# Patient Record
Sex: FEMALE | Race: WHITE | NOT HISPANIC OR LATINO
[De-identification: names, ages, dates, MRNs, and addresses within clinical notes are randomized per-mention and may not be internally consistent; named-entity substitution may affect disease eponyms.]

---

## 2017-01-04 ENCOUNTER — APPOINTMENT (OUTPATIENT)
Dept: SURGICAL ONCOLOGY | Facility: CLINIC | Age: 67
End: 2017-01-04

## 2017-01-04 ENCOUNTER — OTHER (OUTPATIENT)
Age: 67
End: 2017-01-04

## 2017-01-04 VITALS
SYSTOLIC BLOOD PRESSURE: 130 MMHG | BODY MASS INDEX: 39.24 KG/M2 | HEART RATE: 79 BPM | DIASTOLIC BLOOD PRESSURE: 82 MMHG | WEIGHT: 250 LBS | HEIGHT: 67 IN

## 2017-04-05 ENCOUNTER — APPOINTMENT (OUTPATIENT)
Dept: SURGICAL ONCOLOGY | Facility: CLINIC | Age: 67
End: 2017-04-05

## 2017-04-05 VITALS
DIASTOLIC BLOOD PRESSURE: 82 MMHG | BODY MASS INDEX: 39.24 KG/M2 | WEIGHT: 250 LBS | HEIGHT: 67 IN | SYSTOLIC BLOOD PRESSURE: 130 MMHG

## 2017-07-11 ENCOUNTER — FORM ENCOUNTER (OUTPATIENT)
Age: 67
End: 2017-07-11

## 2017-07-12 ENCOUNTER — APPOINTMENT (OUTPATIENT)
Dept: SURGICAL ONCOLOGY | Facility: CLINIC | Age: 67
End: 2017-07-12

## 2017-07-12 ENCOUNTER — APPOINTMENT (OUTPATIENT)
Dept: RADIOLOGY | Facility: CLINIC | Age: 67
End: 2017-07-12

## 2017-07-12 ENCOUNTER — OUTPATIENT (OUTPATIENT)
Dept: OUTPATIENT SERVICES | Facility: HOSPITAL | Age: 67
LOS: 1 days | End: 2017-07-12
Payer: COMMERCIAL

## 2017-07-12 VITALS
TEMPERATURE: 98.1 F | HEART RATE: 84 BPM | SYSTOLIC BLOOD PRESSURE: 138 MMHG | HEIGHT: 68 IN | BODY MASS INDEX: 37.89 KG/M2 | OXYGEN SATURATION: 95 % | DIASTOLIC BLOOD PRESSURE: 82 MMHG | WEIGHT: 250 LBS

## 2017-07-12 DIAGNOSIS — Z00.8 ENCOUNTER FOR OTHER GENERAL EXAMINATION: ICD-10-CM

## 2017-07-12 PROCEDURE — 71046 X-RAY EXAM CHEST 2 VIEWS: CPT

## 2017-10-29 ENCOUNTER — TRANSCRIPTION ENCOUNTER (OUTPATIENT)
Age: 67
End: 2017-10-29

## 2017-11-01 ENCOUNTER — APPOINTMENT (OUTPATIENT)
Dept: SURGICAL ONCOLOGY | Facility: CLINIC | Age: 67
End: 2017-11-01
Payer: COMMERCIAL

## 2017-11-01 VITALS
DIASTOLIC BLOOD PRESSURE: 79 MMHG | HEIGHT: 68 IN | WEIGHT: 250 LBS | SYSTOLIC BLOOD PRESSURE: 154 MMHG | BODY MASS INDEX: 37.89 KG/M2 | HEART RATE: 87 BPM | OXYGEN SATURATION: 97 %

## 2017-11-01 PROCEDURE — 99214 OFFICE O/P EST MOD 30 MIN: CPT

## 2018-02-07 ENCOUNTER — APPOINTMENT (OUTPATIENT)
Dept: SURGICAL ONCOLOGY | Facility: CLINIC | Age: 68
End: 2018-02-07

## 2018-02-08 ENCOUNTER — APPOINTMENT (OUTPATIENT)
Dept: SURGICAL ONCOLOGY | Facility: CLINIC | Age: 68
End: 2018-02-08
Payer: MEDICARE

## 2018-02-08 VITALS
HEIGHT: 67 IN | OXYGEN SATURATION: 99 % | BODY MASS INDEX: 40.18 KG/M2 | DIASTOLIC BLOOD PRESSURE: 84 MMHG | SYSTOLIC BLOOD PRESSURE: 129 MMHG | TEMPERATURE: 97.8 F | HEART RATE: 86 BPM | WEIGHT: 256 LBS

## 2018-02-08 PROCEDURE — 99214 OFFICE O/P EST MOD 30 MIN: CPT

## 2018-05-24 ENCOUNTER — APPOINTMENT (OUTPATIENT)
Dept: SURGICAL ONCOLOGY | Facility: CLINIC | Age: 68
End: 2018-05-24

## 2018-06-13 ENCOUNTER — FORM ENCOUNTER (OUTPATIENT)
Age: 68
End: 2018-06-13

## 2018-06-14 ENCOUNTER — APPOINTMENT (OUTPATIENT)
Dept: RADIOLOGY | Facility: CLINIC | Age: 68
End: 2018-06-14
Payer: MEDICARE

## 2018-06-14 ENCOUNTER — APPOINTMENT (OUTPATIENT)
Dept: SURGICAL ONCOLOGY | Facility: CLINIC | Age: 68
End: 2018-06-14
Payer: MEDICARE

## 2018-06-14 ENCOUNTER — OUTPATIENT (OUTPATIENT)
Dept: OUTPATIENT SERVICES | Facility: HOSPITAL | Age: 68
LOS: 1 days | End: 2018-06-14
Payer: MEDICARE

## 2018-06-14 VITALS
HEART RATE: 79 BPM | BODY MASS INDEX: 40.18 KG/M2 | HEIGHT: 67 IN | WEIGHT: 256 LBS | TEMPERATURE: 98.3 F | DIASTOLIC BLOOD PRESSURE: 79 MMHG | SYSTOLIC BLOOD PRESSURE: 123 MMHG

## 2018-06-14 DIAGNOSIS — Z00.8 ENCOUNTER FOR OTHER GENERAL EXAMINATION: ICD-10-CM

## 2018-06-14 DIAGNOSIS — R91.1 SOLITARY PULMONARY NODULE: ICD-10-CM

## 2018-06-14 PROCEDURE — 71046 X-RAY EXAM CHEST 2 VIEWS: CPT | Mod: 26

## 2018-06-14 PROCEDURE — 71046 X-RAY EXAM CHEST 2 VIEWS: CPT

## 2018-06-14 PROCEDURE — 99213 OFFICE O/P EST LOW 20 MIN: CPT

## 2018-06-15 PROBLEM — R91.1 NODULE OF RIGHT LUNG: Status: ACTIVE | Noted: 2018-06-15

## 2018-06-21 ENCOUNTER — FORM ENCOUNTER (OUTPATIENT)
Age: 68
End: 2018-06-21

## 2018-06-22 ENCOUNTER — OUTPATIENT (OUTPATIENT)
Dept: OUTPATIENT SERVICES | Facility: HOSPITAL | Age: 68
LOS: 1 days | End: 2018-06-22
Payer: MEDICARE

## 2018-06-22 ENCOUNTER — APPOINTMENT (OUTPATIENT)
Dept: CT IMAGING | Facility: CLINIC | Age: 68
End: 2018-06-22
Payer: MEDICARE

## 2018-06-22 DIAGNOSIS — Z00.8 ENCOUNTER FOR OTHER GENERAL EXAMINATION: ICD-10-CM

## 2018-06-22 PROCEDURE — 71250 CT THORAX DX C-: CPT | Mod: 26

## 2018-06-22 PROCEDURE — 71250 CT THORAX DX C-: CPT

## 2018-09-13 ENCOUNTER — APPOINTMENT (OUTPATIENT)
Dept: SURGICAL ONCOLOGY | Facility: CLINIC | Age: 68
End: 2018-09-13
Payer: MEDICARE

## 2018-09-13 VITALS
HEART RATE: 79 BPM | BODY MASS INDEX: 39.87 KG/M2 | SYSTOLIC BLOOD PRESSURE: 124 MMHG | DIASTOLIC BLOOD PRESSURE: 78 MMHG | HEIGHT: 67 IN | WEIGHT: 254 LBS | TEMPERATURE: 98.6 F

## 2018-09-13 PROCEDURE — 99214 OFFICE O/P EST MOD 30 MIN: CPT

## 2019-03-28 ENCOUNTER — APPOINTMENT (OUTPATIENT)
Dept: SURGICAL ONCOLOGY | Facility: CLINIC | Age: 69
End: 2019-03-28
Payer: MEDICARE

## 2019-03-28 VITALS
TEMPERATURE: 98.5 F | HEIGHT: 67 IN | HEART RATE: 81 BPM | SYSTOLIC BLOOD PRESSURE: 135 MMHG | DIASTOLIC BLOOD PRESSURE: 79 MMHG | WEIGHT: 261 LBS | BODY MASS INDEX: 40.97 KG/M2

## 2019-03-28 DIAGNOSIS — Z85.820 PERSONAL HISTORY OF MALIGNANT MELANOMA OF SKIN: ICD-10-CM

## 2019-03-28 PROCEDURE — 99214 OFFICE O/P EST MOD 30 MIN: CPT

## 2019-03-28 NOTE — ASSESSMENT
[FreeTextEntry1] : IMP:\par Hx WLE of the back melanoma and bilateral axillary sentinel lymph node biopsies under the care of Dr. Ignacio Juarez in September 2016.  Final pathology revealed no residual melanoma, maximum tumor thickness measuring 2.2 mm, negative margins, negative lymph nodes. \par Tumor stage: pT3b pN0. \par CXR June 2018 - New faint 1.1 cm nodular density in the right apex, of indeterminate nature.  \par CT Chest 6/22/18 - The abnormality of the recent CXR was secondary to summation of shadows. Bilateral pulmonary nodules, the largest of which measures 5 mm. Left upper lobe ground-glass opacity measuring 5 mm. \par \par No evidence of recurrence\par \par \par PLAN:\par 1) Continue ongoing dermatological surveillance \par 2) CXR June 2019\par 3) RTO in 6 months

## 2019-03-28 NOTE — CONSULT LETTER
[Dear  ___] : Dear  [unfilled], [Courtesy Letter:] : I had the pleasure of seeing your patient, [unfilled], in my office today. [Please see my note below.] : Please see my note below. [Consult Closing:] : Thank you very much for allowing me to participate in the care of this patient.  If you have any questions, please do not hesitate to contact me. [Sincerely,] : Sincerely, [DrSabine  ___] : Dr. CROOKS [FreeTextEntry3] : Yury Mirza MD, MPH, FACS\par Surgical Oncology\par Assistant Professor of Surgery\par [facility] [employer] at [facility]

## 2019-03-28 NOTE — PHYSICAL EXAM
[Normal] : supple, no neck mass and thyroid not enlarged [Normal Neck Lymph Nodes] : normal neck lymph nodes  [Normal Supraclavicular Lymph Nodes] : normal supraclavicular lymph nodes [Normal Axillary Lymph Nodes] : normal axillary lymph nodes [Normal] : oriented to person, place and time, with appropriate affect [de-identified] : no evidence of local or regional occurrence disease back

## 2019-03-28 NOTE — HISTORY OF PRESENT ILLNESS
[de-identified] : Mrs. Reed is a 68 year old female who presents to the office for a 6 month melanoma follow up visit. She is a former patient of Dr. Ignacio Juarez. \par \par She underwent a shave biopsy of an upper back lesion in 7/2016 and was found to have a MELANOMA, ulcerated, nodular type, measuring at least 2.2 mm (outside report said 2.8 mm), mitotic rate >1/mm2.  Inferior back shave biopsy showed a MELANOMA IN SITU, associated with intradermal melanocytic nevus. \par \par Subsequently, she underwent a WLE of the back melanoma and bilateral axillary sentinel lymph node biopsies under the care of Dr. Ignacio Juarez in September 2016.  Final pathology revealed no residual melanoma, maximum tumor thickness measuring 2.2 mm, negative margins, negative lymph nodes. Tumor stage: pT3b pN0. \par \par CXR 7/2017 - clear lungs\par \par INTERVAL HISTORY:\par 6/14/18 - Patient without any new complaints.  No new biopsies. \par \par CXR June 2018 - New faint 1.1 cm nodular density in the right apex, of indeterminate nature. CT Chest recommended. \par \par CT Chest 6/22/18 - The abnormality of the recent CXR was secondary to summation of shadows. Bilateral pulmonary nodules, the largest of which measures 5 mm. Left upper lobe groundglass opacity measuring 5 mm. \par \par 9/13/18 - Continues ongoing dermatological surveillance with no new findings.  Doing very well.  Had all her children / grandchildren over for the summer.\par \par 3/28/19 - Denies new skin lesions or masses. No bleeding or pruritic moles. She continues f/u with ALBERTO Camacho at [facility] and was last seen in 12/2018 with no new biopsies. She states she sees dermatology every 3-4 months.  Feeling well.

## 2019-06-25 ENCOUNTER — APPOINTMENT (OUTPATIENT)
Dept: RADIOLOGY | Facility: CLINIC | Age: 69
End: 2019-06-25

## 2019-06-25 ENCOUNTER — OUTPATIENT (OUTPATIENT)
Dept: OUTPATIENT SERVICES | Facility: HOSPITAL | Age: 69
LOS: 1 days | End: 2019-06-25
Payer: MEDICARE

## 2019-06-25 DIAGNOSIS — Z00.8 ENCOUNTER FOR OTHER GENERAL EXAMINATION: ICD-10-CM

## 2019-06-25 PROCEDURE — 71046 X-RAY EXAM CHEST 2 VIEWS: CPT | Mod: 26

## 2019-06-25 PROCEDURE — 71046 X-RAY EXAM CHEST 2 VIEWS: CPT

## 2019-09-26 ENCOUNTER — APPOINTMENT (OUTPATIENT)
Age: 69
End: 2019-09-26
Payer: MEDICARE

## 2019-09-26 VITALS — DIASTOLIC BLOOD PRESSURE: 82 MMHG | HEART RATE: 71 BPM | SYSTOLIC BLOOD PRESSURE: 143 MMHG

## 2019-09-26 PROCEDURE — 99214 OFFICE O/P EST MOD 30 MIN: CPT

## 2019-09-26 NOTE — HISTORY OF PRESENT ILLNESS
[de-identified] : Mrs. Reed is a 69 year old female who presents to the office for a 6 month melanoma follow up visit. She is a former patient of Dr. Ignacio Juarez. \par \par She underwent a shave biopsy of an upper back lesion in 7/2016 and was found to have a MELANOMA, ulcerated, nodular type, measuring at least 2.2 mm (outside report said 2.8 mm), mitotic rate >1/mm2.  Inferior back shave biopsy showed a MELANOMA IN SITU, associated with intradermal melanocytic nevus. \par \par Subsequently, she underwent a WLE of the back melanoma and bilateral axillary sentinel lymph node biopsies under the care of Dr. Ignacio Juarez in September 2016.  Final pathology revealed no residual melanoma, maximum tumor thickness measuring 2.2 mm, negative margins, negative lymph nodes. Tumor stage: pT3b pN0. \par \par CXR 7/2017 - clear lungs\par \par INTERVAL HISTORY:\par 6/14/18 - Patient without any new complaints.  No new biopsies. \par \par CXR June 2018 - New faint 1.1 cm nodular density in the right apex, of indeterminate nature. CT Chest recommended. \par \par CT Chest 6/22/18 - The abnormality of the recent CXR was secondary to summation of shadows. Bilateral pulmonary nodules, the largest of which measures 5 mm. Left upper lobe groundglass opacity measuring 5 mm. \par \par 9/13/18 - Continues ongoing dermatological surveillance with no new findings.  Doing very well.  Had all her children / grandchildren over for the summer.\par \par 3/28/19 - Denies new skin lesions or masses. No bleeding or pruritic moles. She continues f/u with ALBERTO Camacho at [facility] and was last seen in 12/2018 with no new biopsies. She states she sees dermatology every 3-4 months.  Feeling well. \par \par CXR 6/2019- DAMON, clear lungs \par \par 9/26/19- No new events.  No new biopsies.

## 2019-09-26 NOTE — PHYSICAL EXAM
[Normal] : supple, no neck mass and thyroid not enlarged [Normal Neck Lymph Nodes] : normal neck lymph nodes  [Normal Supraclavicular Lymph Nodes] : normal supraclavicular lymph nodes [Normal Axillary Lymph Nodes] : normal axillary lymph nodes [de-identified] : no evidence of local or regional occurrence disease back [Normal] : oriented to person, place and time, with appropriate affect

## 2019-09-26 NOTE — CONSULT LETTER
[Dear  ___] : Dear  [unfilled], [Please see my note below.] : Please see my note below. [Courtesy Letter:] : I had the pleasure of seeing your patient, [unfilled], in my office today. [Consult Closing:] : Thank you very much for allowing me to participate in the care of this patient.  If you have any questions, please do not hesitate to contact me. [Sincerely,] : Sincerely, [DrSabine  ___] : Dr. CROOKS [FreeTextEntry3] : Yury Mirza MD, MPH, FACS\par Surgical Oncology\par Assistant Professor of Surgery\par [facility] [employer] [facility] at [facility]

## 2019-09-26 NOTE — ASSESSMENT
[FreeTextEntry1] : IMP:\par Hx WLE of the back melanoma and bilateral axillary sentinel lymph node biopsies under the care of Dr. Ignacio Juarez in September 2016.  Final pathology revealed no residual melanoma, maximum tumor thickness measuring 2.2 mm, negative margins, negative lymph nodes. \par Tumor stage: pT3b pN0. \par CXR June 2018 - New faint 1.1 cm nodular density in the right apex, of indeterminate nature.  \par CT Chest 6/22/18 - The abnormality of the recent CXR was secondary to summation of shadows. Bilateral pulmonary nodules, the largest of which measures 5 mm. Left upper lobe ground-glass opacity measuring 5 mm. \par CXR 6/2019- DAMON, clear lungs \par No evidence of recurrence\par \par PLAN:\par 1) Continue ongoing dermatological surveillance \par 2) RTO in 6 months\par 3) CXR 6/2020

## 2020-03-12 ENCOUNTER — APPOINTMENT (OUTPATIENT)
Dept: SURGICAL ONCOLOGY | Facility: CLINIC | Age: 70
End: 2020-03-12
Payer: MEDICARE

## 2020-03-12 VITALS — HEART RATE: 73 BPM | TEMPERATURE: 98.2 F | DIASTOLIC BLOOD PRESSURE: 75 MMHG | SYSTOLIC BLOOD PRESSURE: 119 MMHG

## 2020-03-12 DIAGNOSIS — Z85.820 ENCOUNTER FOR FOLLOW-UP EXAMINATION AFTER COMPLETED TREATMENT FOR MALIGNANT NEOPLASM: ICD-10-CM

## 2020-03-12 DIAGNOSIS — Z08 ENCOUNTER FOR FOLLOW-UP EXAMINATION AFTER COMPLETED TREATMENT FOR MALIGNANT NEOPLASM: ICD-10-CM

## 2020-03-12 PROCEDURE — 99214 OFFICE O/P EST MOD 30 MIN: CPT

## 2020-03-13 PROBLEM — Z08 ENCOUNTER FOR FOLLOW-UP SURVEILLANCE OF MELANOMA: Status: ACTIVE | Noted: 2018-02-08

## 2020-03-13 NOTE — CONSULT LETTER
[Dear  ___] : Dear  [unfilled], [Courtesy Letter:] : I had the pleasure of seeing your patient, [unfilled], in my office today. [Please see my note below.] : Please see my note below. [Consult Closing:] : Thank you very much for allowing me to participate in the care of this patient.  If you have any questions, please do not hesitate to contact me. [Sincerely,] : Sincerely, [FreeTextEntry3] : Yury Mirza MD, MPH, FACS\par Surgical Oncology\par Assistant Professor of Surgery\par [facility] [employer] at [facility] [DrSabine  ___] : Dr. CROOKS

## 2020-03-13 NOTE — HISTORY OF PRESENT ILLNESS
[de-identified] : Mrs. Reed is a 69 year old female who presents to the office for a 6 month melanoma follow up visit. She is a former patient of Dr. Ignacio Juarez. \par \par She underwent a shave biopsy of an upper back lesion in 7/2016 and was found to have a MELANOMA, ulcerated, nodular type, measuring at least 2.2 mm (outside report said 2.8 mm), mitotic rate >1/mm2.  Inferior back shave biopsy showed a MELANOMA IN SITU, associated with intradermal melanocytic nevus. \par \par Subsequently, she underwent a WLE of the back melanoma and bilateral axillary sentinel lymph node biopsies under the care of Dr. Ignacio Juarez in September 2016.  Final pathology revealed no residual melanoma, maximum tumor thickness measuring 2.2 mm, negative margins, negative lymph nodes. Tumor stage: pT3b pN0. \par \par CXR 7/2017 - clear lungs\par \par INTERVAL HISTORY:\par 6/14/18 - Patient without any new complaints.  No new biopsies. \par \par CXR June 2018 - New faint 1.1 cm nodular density in the right apex, of indeterminate nature. CT Chest recommended. \par \par CT Chest 6/22/18 - The abnormality of the recent CXR was secondary to summation of shadows. Bilateral pulmonary nodules, the largest of which measures 5 mm. Left upper lobe groundglass opacity measuring 5 mm. \par \par 9/13/18 - Continues ongoing dermatological surveillance with no new findings.  Doing very well.  Had all her children / grandchildren over for the summer.\par \par 3/28/19 - Denies new skin lesions or masses. No bleeding or pruritic moles. She continues f/u with ALBERTO Camacho at [facility] and was last seen in 12/2018 with no new biopsies. She states she sees dermatology every 3-4 months.  Feeling well. \par \par CXR 6/2019- DAMON, clear lungs \par \par 9/26/19- No new events.  No new biopsies.\par \par 3/12/2020- No new events.

## 2020-03-13 NOTE — PHYSICAL EXAM
[Normal] : supple, no neck mass and thyroid not enlarged [Normal Neck Lymph Nodes] : normal neck lymph nodes  [Normal Supraclavicular Lymph Nodes] : normal supraclavicular lymph nodes [Normal Axillary Lymph Nodes] : normal axillary lymph nodes [Normal] : oriented to person, place and time, with appropriate affect [de-identified] : no evidence of local or regional occurrence disease back

## 2021-08-04 ENCOUNTER — TRANSCRIPTION ENCOUNTER (OUTPATIENT)
Age: 71
End: 2021-08-04

## 2022-09-20 ENCOUNTER — APPOINTMENT (OUTPATIENT)
Dept: ORTHOPEDIC SURGERY | Facility: CLINIC | Age: 72
End: 2022-09-20
Payer: MEDICARE

## 2022-09-20 ENCOUNTER — APPOINTMENT (OUTPATIENT)
Dept: ORTHOPEDIC SURGERY | Facility: CLINIC | Age: 72
End: 2022-09-20

## 2022-09-20 VITALS
HEIGHT: 66 IN | BODY MASS INDEX: 40.18 KG/M2 | SYSTOLIC BLOOD PRESSURE: 141 MMHG | DIASTOLIC BLOOD PRESSURE: 87 MMHG | TEMPERATURE: 97.9 F | HEART RATE: 81 BPM | WEIGHT: 250 LBS

## 2022-09-20 DIAGNOSIS — M87.051 IDIOPATHIC ASEPTIC NECROSIS OF RIGHT FEMUR: ICD-10-CM

## 2022-09-20 DIAGNOSIS — M87.052 IDIOPATHIC ASEPTIC NECROSIS OF LEFT FEMUR: ICD-10-CM

## 2022-09-20 DIAGNOSIS — M25.552 PAIN IN RIGHT HIP: ICD-10-CM

## 2022-09-20 DIAGNOSIS — M25.551 PAIN IN RIGHT HIP: ICD-10-CM

## 2022-09-20 PROCEDURE — 99204 OFFICE O/P NEW MOD 45 MIN: CPT

## 2022-09-20 PROCEDURE — ZZZZZ: CPT

## 2022-09-20 PROCEDURE — 73523 X-RAY EXAM HIPS BI 5/> VIEWS: CPT

## 2022-09-26 NOTE — HISTORY OF PRESENT ILLNESS
[de-identified] : 09/20/2022: Patient is a 72 year-old female who presents to the office today, with her , for initial evaluation of bilateral hip pain. She presents today in a wheelchair that she uses when she goes out of the house. She normally uses a walker and a cane for ambulation around the house. The pain has been like this for the past 2 months and is progressively worsening. Patient denies any injury or inciting event. She does note that she was walking 2-3 miles per day with her neighbor just a few months ago. She notes that she has progressed quickly to being inactive due to her hip pain. She does note that her pain is worse on the right than the left. Currently, she is on oxycodone for the pain that is prescribed by her oncologist. Motrin significantly worsens bleeding of her hemorrhoids. \par \par She sees an oncologist at [facility] [place]. She has a history of metastatic melanoma for which she has been treated with infusions of medications. She believes that she is in remission.  \par

## 2022-09-26 NOTE — PHYSICAL EXAM
[Normal Finger/nose] : finger to nose coordination [Normal] : no peripheral adenopathy appreciated [Poor Appearance] : well-appearing [Acute Distress] : not in acute distress [de-identified] : Bilateral Hip Examination\par  \par Patient unable to physically get out of her wheelchair and refused full physical examination, including ROM, due to significant pain\par Only examination the patient was able to address was neurovascular status\par Calves soft/NT, +DF/PF bilaterally\par Sensation intact to touch throughout leg and equal bilaterally, distal pulses 2+.\par  [de-identified] : SEKOUR [de-identified] : 3 xray views of the PELVIS and bilateral hips\par \par -Significant collapse of bilateral femoral heads consistent with severe osteoarthritis or AVN of the hips\par -Left superior/inferior pubic rami fracture

## 2022-09-26 NOTE — ASSESSMENT
[FreeTextEntry1] : Patient presents with bilateral hip pain. Their symptoms are consistent with likely bilateral hip AVN as well as a left superior/inferior pubic rami fracture. The nature of these conditions were described at length with the patient and they verbalized understanding. \par \par Recommendations: \par -A long discussion was had with the patient about treatment options. She was advised to seek surgical consultation as soon as she can with Dr. Salazar. All options including, conservative observation, PT, injections, surgery, etc. were discussed at length with the patient at long length. \par -It was discussed that likely surgery would be the most effective course of treatment to improve pain/quality of life for her. Surgical intervention is complicated for her due to numerous factors.\par -She asked if she could be admitted to [facility] for emergency surgery, however, I discussed with her that this would not be considered an emergent condition and would have to be set up electively with many different situations that would need to be addressed in order to optimize for surgery (if indicated by surgeon).\par -Cortisone injections to the hip will likely be ineffective at this time\par -Conservative observation is likely unreasonable at this time\par -Patient should participate in PT for lower extremity strengthening/conditioning, as she has been in a wheelchair most of the time over the past 1-2 months. Significant deconditioning is expected if no PT is performed. I recommend home PT, as the patient cannot drive/travel in her current state. This will optimize the patient from a rehabilitation standpoint for possible surgical intervention, should it be indicated.\par -Oral anti-inflammatories are contraindicated in the patient. She expresses a history of significant rectal/hemorrhoidal bleeding when taking NSAIDs. I explained that these medications would likely improve her symptoms slightly, but the risk of bleeding could delay/impact any possible surgical intervention due to decreased hemoglobin/hematocrit, and therefore NSAIDs should not be used. She is encouraged to take Tylenol and continue with the oxycodone that was prescribed by her oncologist.\par -She has a complicated medical history with her melanoma and I explained that there will likely be medical optimization needed prior to any surgical procedure. She knows that this would be discussed with her surgeon, should surgery be indicated at her visit.\par -Patient was encouraged to be out of the wheelchair for assisted ambulation as much as possible. She endorses the use of cane/walker at home for ambulation assistance.\par -Followup with Dr. Salazar as soon as possible for possible surgical intervention/discussion.\par \par 54 minutes of time was utilized to obtain a patient history, obtain and interpret proper radiographic imaging, perform a detailed physical examination, discuss the patient's diagnosis at length, inform the patient about the different treatment plans for their condition, and answer questions the patient had regarding their visit.\par \par \par The patient was in full agreement with this plan and appreciative of their care.\par

## 2022-09-28 ENCOUNTER — APPOINTMENT (OUTPATIENT)
Dept: ORTHOPEDIC SURGERY | Facility: CLINIC | Age: 72
End: 2022-09-28

## 2022-09-28 VITALS
WEIGHT: 250 LBS | DIASTOLIC BLOOD PRESSURE: 82 MMHG | BODY MASS INDEX: 40.18 KG/M2 | SYSTOLIC BLOOD PRESSURE: 128 MMHG | HEART RATE: 82 BPM | HEIGHT: 66 IN

## 2022-09-28 DIAGNOSIS — S32.592D OTHER SPECIFIED FRACTURE OF LEFT PUBIS, SUBSEQUENT ENCOUNTER FOR FRACTURE WITH ROUTINE HEALING: ICD-10-CM

## 2022-09-28 PROCEDURE — 99215 OFFICE O/P EST HI 40 MIN: CPT

## 2022-09-28 NOTE — PHYSICAL EXAM
[LE] : Sensory: Intact in bilateral lower extremities [ALL] : dorsalis pedis, posterior tibial, femoral, popliteal, and radial 2+ and symmetric bilaterally [Normal] : Alert and in no acute distress [Wheelchair] : uses a wheelchair [Poor Appearance] : well-appearing [de-identified] : GENERAL APPEARANCE: Well nourished and hydrated, pleasant, alert, and oriented x 3. Appears their stated age. \par HEENT: Normocephalic, extraocular eye motion intact. Nasal septum midline. Oral cavity clear. External auditory canal clear. \par RESPIRATORY: Breath sounds clear and audible in all lobes. No wheezing, No accessory muscle use.\par CARDIOVASCULAR: No apparent abnormalities. No lower leg edema. No varicosities. Pedal pulses are palpable.\par NEUROLOGIC: Sensation is normal, no muscle weakness in the upper or lower extremities.\par DERMATOLOGIC: No apparent skin lesions, moist, warm, no rash.\par SPINE: Cervical spine appears normal and moves freely; thoracic spine appears normal and moves freely; lumbosacral spine appears normal and moves freely, normal, nontender.\par MUSCULOSKELETAL: Hands, wrists, and elbows are normal and move freely, shoulders are normal and move freely.  [de-identified] : Bilateral hip exam shows positive Stinchfield, pain with ROM, greater trochanteric pain.  Unable to ambulate secondary to pain [de-identified] : 5V xray of the b/l hip done in the office by Ehsan Katz nd reviewed by Dr. Randy Salazar demonstrates avascular necrosis and with collapse of the articular surface of both hips. She has marked osteopenia.

## 2022-09-28 NOTE — DISCUSSION/SUMMARY
[Medication Risks Reviewed] : Medication risks reviewed [Surgical risks reviewed] : Surgical risks reviewed [de-identified] : 73 y/o F pt presents with  avascular necrosis and with collapse of the articular surface of both hips. Based on her imaging, she is a candidate for a staged bilateral RAVI t. The surgery was discussed in detail. The pt states that she has been failing conservative therapy options. We recommend having a DEXA scan for her bones since she does have osteoporosis. Since her pain has been effecting her quality of life we suggest having the surgery as soon as possible. She will talk with the surgical coordinators to have both hips done. All questions were answered.  We are going to stage these hips apart by only a few days within the same hospitalization because she is unable to ambulate and I think she will not progress with only doing one side.  She is at high risk of very operative complication specifically fracture and infection.  We will schedule her right hip for Thursday followed by a left hip replacement on a Tuesday and she will remain in the hospital during a period\par \par The patient is a 72 year individual with end stage arthritis of their b/l hips joint. Based upon the patient's continued symptoms and failure to respond to conservative treatment I have recommended a b/l total hips arthroplasty for this patient. A long discussion took place with the patient describing what a total joint replacement is and what the procedure would entail. A total hip arthroplasty model, similar to the implant that was used during the operation, was utilized to demonstrate and to discuss the various bearing surfaces of the implants. The hospitalization and post-operative care and rehabilitation were also discussed. The use of perioperative antibiotics and DVT prophylaxis were discussed. The risk, benefits and alternatives to a surgical intervention were discussed at length with the patient. The patient was also advised of risks related to the medical comorbidities, elevated body mass index (BMI), and smoking where applicable. We discussed how to reduce modifiable risk factors and encouraged smoking cessation were applicable.. A lengthy discussion took place to review the most common complications including but not limited to: deep vein thrombosis, pulmonary embolus, heart attack, stroke, infection, wound breakdown, numbness, damage to nerves, tendon, muscles, arteries or other blood vessels, death and other possible complications from anesthesia. The patient was told that we will take steps to minimize these risks by using sterile technique, antibiotics and DVT prophylaxis when appropriate and follow the patient postoperatively in the office setting to monitor progress. The possibility of recurrent pain, no improvement in pain and actual worsening of pain were also discussed with the patient.\par The discharge plan of care focused on the patient going home following surgery. The patient was encouraged to make the necessary arrangements to have someone stay with them when they are discharged home. Following discharge, a home care nurse was to the patient. The home care nurse would open the patient’s home care case and request home physical therapy services. Home physical therapy was to commence following discharge provided it was appropriate and covered by the health insurance benefit plan. \par The benefits of surgery were discussed with the patient including the potential for improving her current clinical condition through operative intervention. Alternatives to surgical intervention including continued conservative management were also discussed in detail. All questions were answered to the satisfaction of the patient. The treatment plan of care, as well as a model of a total hip arthroplasty equivalent to the one that will be used for their total joint replacement, was shared with the patient. The patient agreed to the plan of care as well as the use of implants in their total joint replacement.

## 2022-09-28 NOTE — HISTORY OF PRESENT ILLNESS
[6] : a current pain level of 6/10 [2] : a minimum pain level of 2/10 [9] : a maximum pain level of 9/10 [Bending] : worsened by bending [Walking] : worsened by walking [Rest] : relieved by rest [Pain Location] : pain [Worsening] : worsening [de-identified] : Patient is a 72 year old female who presents for bilateral hip pain. patient was treated with immunotherapy for Melanoma which she is currently in remission however developed bilateral hip pain over past few months.  patient on oxycodone for pain.  patient unable to take NSAIDS.  patient unable to ambulate due to pain.  patient states unable to perform any ADL's\par \par pmhx includes melanoma and htn\par patient being treated by [facility]\par She has a hx of osteoporosis.

## 2022-10-04 ENCOUNTER — OUTPATIENT (OUTPATIENT)
Dept: OUTPATIENT SERVICES | Facility: HOSPITAL | Age: 72
LOS: 1 days | End: 2022-10-04
Payer: MEDICARE

## 2022-10-04 VITALS
OXYGEN SATURATION: 98 % | HEART RATE: 84 BPM | SYSTOLIC BLOOD PRESSURE: 140 MMHG | HEIGHT: 66 IN | RESPIRATION RATE: 16 BRPM | WEIGHT: 250.45 LBS | TEMPERATURE: 98 F | DIASTOLIC BLOOD PRESSURE: 86 MMHG

## 2022-10-04 DIAGNOSIS — I10 ESSENTIAL (PRIMARY) HYPERTENSION: ICD-10-CM

## 2022-10-04 DIAGNOSIS — E03.9 HYPOTHYROIDISM, UNSPECIFIED: ICD-10-CM

## 2022-10-04 DIAGNOSIS — I82.409 ACUTE EMBOLISM AND THROMBOSIS OF UNSPECIFIED DEEP VEINS OF UNSPECIFIED LOWER EXTREMITY: ICD-10-CM

## 2022-10-04 DIAGNOSIS — Z29.9 ENCOUNTER FOR PROPHYLACTIC MEASURES, UNSPECIFIED: ICD-10-CM

## 2022-10-04 DIAGNOSIS — Z80.1 FAMILY HISTORY OF MALIGNANT NEOPLASM OF TRACHEA, BRONCHUS AND LUNG: Chronic | ICD-10-CM

## 2022-10-04 DIAGNOSIS — C43.9 MALIGNANT MELANOMA OF SKIN, UNSPECIFIED: ICD-10-CM

## 2022-10-04 DIAGNOSIS — Z98.890 OTHER SPECIFIED POSTPROCEDURAL STATES: Chronic | ICD-10-CM

## 2022-10-04 DIAGNOSIS — M16.11 UNILATERAL PRIMARY OSTEOARTHRITIS, RIGHT HIP: ICD-10-CM

## 2022-10-04 DIAGNOSIS — Z01.818 ENCOUNTER FOR OTHER PREPROCEDURAL EXAMINATION: ICD-10-CM

## 2022-10-04 LAB
A1C WITH ESTIMATED AVERAGE GLUCOSE RESULT: 5.4 % — SIGNIFICANT CHANGE UP (ref 4–5.6)
ANION GAP SERPL CALC-SCNC: 10 MMOL/L — SIGNIFICANT CHANGE UP (ref 5–17)
APTT BLD: 28.3 SEC — SIGNIFICANT CHANGE UP (ref 27.5–35.5)
BLD GP AB SCN SERPL QL: SIGNIFICANT CHANGE UP
BUN SERPL-MCNC: 11.2 MG/DL — SIGNIFICANT CHANGE UP (ref 8–20)
CALCIUM SERPL-MCNC: 9.7 MG/DL — SIGNIFICANT CHANGE UP (ref 8.4–10.5)
CHLORIDE SERPL-SCNC: 92 MMOL/L — LOW (ref 98–107)
CO2 SERPL-SCNC: 29 MMOL/L — SIGNIFICANT CHANGE UP (ref 22–29)
CREAT SERPL-MCNC: 0.66 MG/DL — SIGNIFICANT CHANGE UP (ref 0.5–1.3)
EGFR: 94 ML/MIN/1.73M2 — SIGNIFICANT CHANGE UP
ESTIMATED AVERAGE GLUCOSE: 108 MG/DL — SIGNIFICANT CHANGE UP (ref 68–114)
GLUCOSE SERPL-MCNC: 110 MG/DL — HIGH (ref 70–99)
HCT VFR BLD CALC: 37.1 % — SIGNIFICANT CHANGE UP (ref 34.5–45)
HGB BLD-MCNC: 12.4 G/DL — SIGNIFICANT CHANGE UP (ref 11.5–15.5)
INR BLD: 1.12 RATIO — SIGNIFICANT CHANGE UP (ref 0.88–1.16)
MCHC RBC-ENTMCNC: 31 PG — SIGNIFICANT CHANGE UP (ref 27–34)
MCHC RBC-ENTMCNC: 33.4 GM/DL — SIGNIFICANT CHANGE UP (ref 32–36)
MCV RBC AUTO: 92.8 FL — SIGNIFICANT CHANGE UP (ref 80–100)
PLATELET # BLD AUTO: 476 K/UL — HIGH (ref 150–400)
POTASSIUM SERPL-MCNC: 4 MMOL/L — SIGNIFICANT CHANGE UP (ref 3.5–5.3)
POTASSIUM SERPL-SCNC: 4 MMOL/L — SIGNIFICANT CHANGE UP (ref 3.5–5.3)
PROTHROM AB SERPL-ACNC: 13 SEC — SIGNIFICANT CHANGE UP (ref 10.5–13.4)
RBC # BLD: 4 M/UL — SIGNIFICANT CHANGE UP (ref 3.8–5.2)
RBC # FLD: 12.3 % — SIGNIFICANT CHANGE UP (ref 10.3–14.5)
SODIUM SERPL-SCNC: 131 MMOL/L — LOW (ref 135–145)
WBC # BLD: 7.74 K/UL — SIGNIFICANT CHANGE UP (ref 3.8–10.5)
WBC # FLD AUTO: 7.74 K/UL — SIGNIFICANT CHANGE UP (ref 3.8–10.5)

## 2022-10-04 PROCEDURE — G0463: CPT

## 2022-10-04 PROCEDURE — 93010 ELECTROCARDIOGRAM REPORT: CPT

## 2022-10-04 PROCEDURE — 93005 ELECTROCARDIOGRAM TRACING: CPT

## 2022-10-04 NOTE — H&P PST ADULT - PROBLEM SELECTOR PLAN 4
Caprini Score 14 High risk,  Surgical team should assess /Strongly recommend pharmacological and mechanical measures for VTE prophylaxis

## 2022-10-04 NOTE — H&P PST ADULT - SYMPTOMS
ASSESSMENT:   1. ADHD, combined type.   2. Anxiety.   3. Constipation; in remission.   4. Nocturnal enuresis, persists.  5. Special education with current services under an IEP.        RECOMMENDATIONS:   1. Diagnostic:  No changes at this time.  2. Counseling and Education:  Substantial guidance, education and counseling today with regard to my interpretation and therapeutic recommendations as described above.   3. Diet:  No changes.   4. Sleep:  No changes.  5. Self-monitoring: No changes.   6. Self-regulation:  No changes.  7. Behavior modification: Continue with strategies.  8. Medication:  Continues on trial off Concerta at this time. Continue fluoxetine 10 mg daily. Continue guanfacine 1mg TID, morning and 1430, and pm.   9. Followup:  I would like to continue to follow up with Hawk and his parents monthly, flip-flopping sessions.    none

## 2022-10-04 NOTE — H&P PST ADULT - NSANTHOSAYNRD_GEN_A_CORE
No. KENNEDI screening performed.  STOP BANG Legend: 0-2 = LOW Risk; 3-4 = INTERMEDIATE Risk; 5-8 = HIGH Risk

## 2022-10-04 NOTE — H&P PST ADULT - ASSESSMENT
72 year old female with PMH of HTN, Hypothyroidism, Metastatic Melanoma here for PST states that she has pain in her right hip fro the last 2 months, reports 10/10 pain with most of the activities, she said she was on steroids for her Brain lesions and it made her arthritis worse, its bone on bone, she is scheduled for a right Hip Total Joint replacement by dr. Salazar on 10/20/22. Covid vaccine series completed, card in chart, covid test is on 10/17/22. Medical Clearance pending     medications reviewed, instructions given on what medications to take and what not to take.    72 year old female with PMH of HTN, Hypothyroidism, Metastatic Melanoma here for PST states that she has pain in her right hip fro the last 2 months, reports 10/10 pain with most of the activities, she said she was on steroids for her Brain lesions and it made her arthritis worse, its bone on bone, she is scheduled for a right Hip Total Joint replacement by dr. Salazar on 10/20/22. Covid vaccine series completed, card in chart, covid test is on 10/17/22. Medical Clearance pending     medications reviewed, instructions given on what medications to take and what not to take. Please take Levothyroxine, Pantoprazole and keppra with water in the AM of surgery. Last dose of Losartan /HCTZ is 10/19/22 AM. All other meds can be continued till the night before surgery. Asked the pt not to take any NSAID's 5-7 days before surgery and told the pt Tylenol is okay to take for pain, pt verbalized understanding. Asked the patient to consult with PCP/cardiologist about holding ASA/Plavix  and stop/Hold it accordingly, failure to do so may result in cancellation or postponement of your surgery, pt  agreed and verbalized understanding.   CAPRINI VTE 2.0 SCORE [CLOT updated 2019]    AGE RELATED RISK FACTORS                                                       MOBILITY RELATED FACTORS  [ ] Age 41-60 years                                            (1 Point)                    [ ] Bed rest                                                        (1 Point)  [x ] Age: 61-74 years                                           (2 Points)                  [ ] Plaster cast                                                   (2 Points)  [ ] Age= 75 years                                              (3 Points)                    [ ] Bed bound for more than 72 hours                 (2 Points)    DISEASE RELATED RISK FACTORS                                               GENDER SPECIFIC FACTORS  [ x] Edema in the lower extremities                       (1 Point)              [ ] Pregnancy                                                     (1 Point)  [ ] Varicose veins                                               (1 Point)                     [ ] Post-partum < 6 weeks                                   (1 Point)             [ x] BMI > 25 Kg/m2                                            (1 Point)                     [ ] Hormonal therapy  or oral contraception          (1 Point)                 [ ] Sepsis (in the previous month)                        (1 Point)               [ ] History of pregnancy complications                 (1 point)  [ ] Pneumonia or serious lung disease                                               [ ] Unexplained or recurrent                     (1 Point)           (in the previous month)                               (1 Point)  [ ] Abnormal pulmonary function test                     (1 Point)                 SURGERY RELATED RISK FACTORS  [ ] Acute myocardial infarction                              (1 Point)               [ ]  Section                                             (1 Point)  [ ] Congestive heart failure (in the previous month)  (1 Point)      [ ] Minor surgery                                                  (1 Point)   [ ] Inflammatory bowel disease                             (1 Point)               [ ] Arthroscopic surgery                                        (2 Points)  [ ] Central venous access                                      (2 Points)                [ ] General surgery lasting more than 45 minutes (2 points)  [x ] Malignancy- Present or previous                   (2 Points)                [ x] Elective arthroplasty                                         (5 points)    [ ] Stroke (in the previous month)                          (5 Points)                                                                                                                                                           HEMATOLOGY RELATED FACTORS                                                 TRAUMA RELATED RISK FACTORS  [x ] Prior episodes of VTE                                     (3 Points)                [ ] Fracture of the hip, pelvis, or leg                       (5 Points)  [ ] Positive family history for VTE                         (3 Points)             [ ] Acute spinal cord injury (in the previous month)  (5 Points)  [ ] Prothrombin 17379 A                                     (3 Points)               [ ] Paralysis  (less than 1 month)                             (5 Points)  [ ] Factor V Leiden                                             (3 Points)                  [ ] Multiple Trauma within 1 month                        (5 Points)  [ ] Lupus anticoagulants                                     (3 Points)                                                           [ ] Anticardiolipin antibodies                               (3 Points)                                                       [ ] High homocysteine in the blood                      (3 Points)                                             [ ] Other congenital or acquired thrombophilia      (3 Points)                                                [ ] Heparin induced thrombocytopenia                  (3 Points)                                     Total Score [      14    ]  OPIOID RISK TOOL    CHELSY EACH BOX THAT APPLIES AND ADD TOTALS AT THE END    FAMILY HISTORY OF SUBSTANCE ABUSE                 FEMALE         MALE                                                Alcohol                             [  ]1 pt          [  ]3pts                                               Illegal Drugs                     [  ]2 pts        [  ]3pts                                               Rx Drugs                           [  ]4 pts        [  ]4 pts    PERSONAL HISTORY OF SUBSTANCE ABUSE                                                                                          Alcohol                             [  ]3 pts       [  ]3 pts                                               Illegal Drugs                     [  ]4 pts        [  ]4 pts                                               Rx Drugs                           [  ]5 pts        [  ]5 pts    AGE BETWEEN 16-45 YEARS                                      [  ]1 pt         [  ]1 pt    HISTORY OF PREADOLESCENT   SEXUAL ABUSE                                                             [  ]3 pts        [  ]0pts    PSYCHOLOGICAL DISEASE                     ADD, OCD, Bipolar, Schizophrenia        [  ]2 pts         [  ]2 pts                      Depression                                               [  ]1 pt           [  ]1 pt           SCORING TOTAL   (add numbers and type here)              ( 0)                                     A score of 3 or lower indicated LOW risk for future opioid abuse  A score of 4 to 7 indicated moderate risk for future opioid abuse  A score of 8 or higher indicates a high risk for opioid abuse

## 2022-10-04 NOTE — H&P PST ADULT - NSICDXPASTSURGICALHX_GEN_ALL_CORE_FT
PAST SURGICAL HISTORY:  Family history of lung cancer     H/O craniotomy 2013, had a hemorrhage , no more F/U by the neurosurgeon    H/O melanoma excision from the back

## 2022-10-04 NOTE — H&P PST ADULT - I HAVE PERSONALLY SEEN AND EXAMINED THE PATIENT. THERE HAVE NOT BEEN ANY CHANGES IN THE PATIENT'S HISTORY OR EXAM UNLESS COMMENTED BELOW
[facility]  [address] [place] [address]   [address] [place] [id number]     2017    STEPHANIE ONOFRE ( 1947)    OFFICE VISIT    PROBLEM LIST:  Dx Date Dx Code Description Status Laterality T N M   3/4/2009 [ICD10] C50.211 Malignant neoplasm of upper-inner quadrant of right female breast ER+ PgR? [id number] - Right - origin o T3 N3a M0     [ICD10] C79.51 Secondary malignant neoplasm of bone               [ICD10] C79.31 Secondary malignant neoplasm of brain               [ICD10] Z51.12 Encounter for antineoplastic immunotherapy               [ICD10] Z51.11 Encounter for antineoplastic chemotherapy               [ICD10] Z79.811 Long term (current) use of aromatase inhibitors               [ICD10] Z79.83 Long term (current) use of bisphosphonates               [ICD10] D64.9 Anemia, unspecified               [ICD10] I26.99 Other pulmonary embolism without acute cor pulmonale               [ICD10] Z79.01 Long term (current) use of anticoagulants               REASON FOR VISIT: Here to continue salvage treatment with vinorelbine for metastatic breast cancer.  Over the last 6 days, she has noticed increasing dyspnea.  She denies fever or chills.  She has had a nonproductive cough.    SOCIAL HISTORY:       FAMILY HISTORY:      CURRENT MEDICATIONS:   Drug Name Dose Route Frequency   Vitamin D 2,000 Units PO QD   Cogentin 1 mg PO As needed   Levoxyl 0.5 mcg PO QD   Multiple Vitamins 800 mg PO QD   Warfarin   Oral Daily   furosemide 1 Tablet Oral Daily   Xgeva 120 mg SQ subcutaneous every month   Haldol 0.5 mg Oral     omeprazole 1 Capsule Oral Daily   warfarin 1 - 2 Tablet Oral as directed by MD     REVIEW OF SYSTEMS: As above.     PERFORMANCE STATUS: ECOG Score 0.     DISTRESS CODE: Not done today.    PHYSICAL EXAMINATION:   GENERAL: She appears fatigued.    VITAL SIGNS: Blood pressure 150/86, weight 220 pounds-weight, stable.  She has tachypnea and has  conversational dyspnea.  HEENT: Anicteric.  No stomatitis. Oral mucous membranes are moist. I saw no stomatitis or thrush.   NECK: Supple.  No cervical or supraclavicular adenopathy.  I could feel no  axillary adenopathy.  BREAST: Right mastectomy site well healed. There is some telangiectasia in the right lateral chest and axilla.  The left breast showed no mass.  LUNGS: Breath sounds are mildly diminished bilaterally.  I heard no wheezing.  CARDIAC: Regular rhythm, S1 and S2 are normal.  There is a Port-A-Cath in place on the left.  The site appears clean.  ABDOMEN: Obese and soft.  I felt no abdominal mass.   : Deferred  EXTREMITIES: Trace ankle edema bilaterally.   NEURO: She is alert and well oriented.                PAIN ASSESSMENT: She denies pain today.  EMOTIONAL/PSYCH: She is in good spirits today.      LABORATORY DATA:   Date 1/23/2017   Time 12:00 AM   CBC       WBC (10^3/uL) 7.3     NEUT% (%) 80.7 H     NEUT (10^3/uL) 5.9     HGB (g/dL) 9.9 L     HCT (%) 30.3 L     PLT (10^3/uL) 393.0 H   Other Labs       RBC (10^6/uL) 3.59 L     LYMPH (10^3/uL) 0.7 L     LYMPH% (%) 9.8 L     MONO% (%) 8.8     EOS (10^3/uL) 0.0     MONO (10^3/uL) 0.6     EOS% (%) 0.4     BASO% (%) 0.3     BASO (10^3/uL) 0.0     MCV (fml) 84.4     MCH (pg) 27.6     MCHC (g/dL) 32.7     RDW-SD (fml) 63.5 H     RDW-CV (%) 22.2     MPV (fml) 9.8   Her INR today is 2.0.    DIAGNOSTIC TESTS: None     ASSESSMENT: 1. Metastatic breast cancer.  Her disease appears to be limited to the bone.  Her disease progressed on exemestane and everolimus.  Her disease has progressed on fulvestrant.  She had progressive disease after a good response to capecitabine.  She is tolerating salvage treatment with vinorelbine well.  She has had an increase in her tumor marker; however, her follow-up MRI showed her spinal metastases to be stable.  She does report some new right-sided chest wall pain.  This could be referred pain related to the thoracic spine  process or new disease in the chest wall.  CT scan shows no evidence for soft tissue metastases.  2. She has had a history of lower extremity deep venous thrombosis and pulmonary emboli.  She had an episode of upper gastrointestinal hemorrhage while on rivaroxaban.  She was found to have a Dilieufoy lesion in the stomach which was cauterized and clipped.  Her rivaroxaban was discontinued. She has been on warfarin without further bleeding problems.  3. New onset respiratory distress.  She reports some cough for 1 week but denies fever or chills. I hear no wheezing.   Need to consider recurrent PE, pneumonia or metastatic disease.  Ct chest from December showed no evidence for pulmonary mets.    PLAN: 1. Continue monthly denosumab.  Hold her vinorelbine today.  Because of her significant change in respiratory status, I have recommended that she report to the emergency department        Electronically signed  Duke Oro M.D.  January 23, 2017, 5:35 PM     Statement Selected

## 2022-10-04 NOTE — H&P PST ADULT - PROBLEM SELECTOR PLAN 6
Right Hip Total Joint replacement by dr. Salazar on 10/20/22. Covid vaccine series completed, card in chart, covid test is on 10/17/22. Medical and Oncology (Has Metastatic melanoma mets to Brain) Clearance pending

## 2022-10-04 NOTE — H&P PST ADULT - LAB RESULTS AND INTERPRETATION
10/5/2022: Pt lab reviewed. Email sent to SALOMÓN DOMINGUEZ and RN In Dr. Salazar's office regarding abnormal lab results. Pt was also notified. Copies of Lab results to be faxed to PCP office today. 10/5/2022: Pt lab reviewed. Email sent to SALOMÓN DOMINGUEZ and RN In Dr. Salazar's office regarding abnormal lab results. Pt was also notified. Copies of Lab results to be faxed to PCP office today.  10/5/2022@1020: Pt PCP office notified of lab results, spoke to Jeanine

## 2022-10-04 NOTE — CHART NOTE - NSCHARTNOTEFT_GEN_A_CORE
delaney' Prescriptions  Patient Name: Su ReedBirth Date: 1950  Address: [address] [place] [id number]: Female  Rx Written	Rx Dispensed	Drug	Quantity	Days Supply	Prescriber Name	Prescriber Mami #	Payment Method	Dispenser  09/29/2022	09/29/2022	oxycodone hcl (ir) 5 mg tablet	56	14	Zach Rhodes	[id number]	Medicare	[id number]  09/15/2022	09/15/2022	oxycodone hcl (ir) 5 mg tablet	56	14	Zach Rhodes	[id number]	Medicare	[id number]

## 2022-10-04 NOTE — H&P PST ADULT - HISTORY OF PRESENT ILLNESS
72 year old female with PMH of HTN, Hypothyroidism, Metastatic Melanoma here for PST states that she has pain in her right hip fro the last 2 months, reports 10/10 pain with most of the activities, she said she was on steroids for her Brain lesions and it made her arthritis worse, its bone on bone, she is scheduled for a right Hip Total Joint replacement by dr. Salazar on 10/20/22. Covid vaccine series completed, card in chart, covid test is on 10/17/22. Medical Clearance pending  72 year old female with PMH of HTN, Hypothyroidism, Metastatic Melanoma here for PST states that she has pain in her right hip fro the last 2 months, reports 10/10 pain with most of the activities, she said she was on steroids for her Brain lesions(from Melanoma)  and it made her arthritis worse, its bone on bone, she is scheduled for a right Hip Total Joint replacement by dr. Salazar on 10/20/22. Covid vaccine series completed, card in chart, covid test is on 10/17/22. Medical Clearance pending

## 2022-10-04 NOTE — H&P PST ADULT - NSICDXPASTMEDICALHX_GEN_ALL_CORE_FT
PAST MEDICAL HISTORY:  DVT, lower extremity left 2021, has a valery filter    Hypertension     Hypothyroidism     Melanoma diagnosed in 2016, had an excision, 2021 diagnesed metastatic, brain lesion and different spots in her body, had radiation and immunotherapy and steroids, not on any Rx now. treated at [facility]

## 2022-10-05 LAB
MRSA PCR RESULT.: SIGNIFICANT CHANGE UP
S AUREUS DNA NOSE QL NAA+PROBE: SIGNIFICANT CHANGE UP

## 2022-10-19 ENCOUNTER — TRANSCRIPTION ENCOUNTER (OUTPATIENT)
Age: 72
End: 2022-10-19

## 2022-10-19 NOTE — PATIENT PROFILE ADULT - FALL HARM RISK - HARM RISK INTERVENTIONS
Assistance with ambulation/Assistance OOB with selected safe patient handling equipment/Communicate Risk of Fall with Harm to all staff/Discuss with provider need for PT consult/Monitor gait and stability/Provide patient with walking aids - walker, cane, crutches/Reinforce activity limits and safety measures with patient and family/Sit up slowly, dangle for a short time, stand at bedside before walking/Tailored Fall Risk Interventions/Use of alarms - bed, chair and/or voice tab/Visual Cue: Yellow wristband and red socks/Bed in lowest position, wheels locked, appropriate side rails in place/Call bell, personal items and telephone in reach/Instruct patient to call for assistance before getting out of bed or chair/Non-slip footwear when patient is out of bed/[place] to call system/Physically safe environment - no spills, clutter or unnecessary equipment/Purposeful Proactive Rounding/Room/bathroom lighting operational, light cord in reach

## 2022-10-20 ENCOUNTER — TRANSCRIPTION ENCOUNTER (OUTPATIENT)
Age: 72
End: 2022-10-20

## 2022-10-20 ENCOUNTER — INPATIENT (INPATIENT)
Facility: HOSPITAL | Age: 72
LOS: 6 days | Discharge: HOME HEALTH | DRG: 462 | End: 2022-10-27
Attending: ORTHOPAEDIC SURGERY | Admitting: ORTHOPAEDIC SURGERY
Payer: MEDICARE

## 2022-10-20 ENCOUNTER — APPOINTMENT (OUTPATIENT)
Dept: ORTHOPEDIC SURGERY | Facility: HOSPITAL | Age: 72
End: 2022-10-20

## 2022-10-20 VITALS
HEART RATE: 88 BPM | OXYGEN SATURATION: 99 % | HEIGHT: 65.75 IN | SYSTOLIC BLOOD PRESSURE: 155 MMHG | TEMPERATURE: 99 F | DIASTOLIC BLOOD PRESSURE: 76 MMHG | WEIGHT: 249.12 LBS

## 2022-10-20 DIAGNOSIS — Z80.1 FAMILY HISTORY OF MALIGNANT NEOPLASM OF TRACHEA, BRONCHUS AND LUNG: Chronic | ICD-10-CM

## 2022-10-20 DIAGNOSIS — Z98.890 OTHER SPECIFIED POSTPROCEDURAL STATES: Chronic | ICD-10-CM

## 2022-10-20 DIAGNOSIS — M16.12 UNILATERAL PRIMARY OSTEOARTHRITIS, LEFT HIP: ICD-10-CM

## 2022-10-20 DIAGNOSIS — M16.11 UNILATERAL PRIMARY OSTEOARTHRITIS, RIGHT HIP: ICD-10-CM

## 2022-10-20 LAB
ABO RH CONFIRMATION: SIGNIFICANT CHANGE UP
GLUCOSE BLDC GLUCOMTR-MCNC: 100 MG/DL — HIGH (ref 70–99)
GLUCOSE BLDC GLUCOMTR-MCNC: 115 MG/DL — HIGH (ref 70–99)
GLUCOSE BLDC GLUCOMTR-MCNC: 89 MG/DL — SIGNIFICANT CHANGE UP (ref 70–99)

## 2022-10-20 PROCEDURE — 73502 X-RAY EXAM HIP UNI 2-3 VIEWS: CPT | Mod: 26,RT

## 2022-10-20 PROCEDURE — 27130 TOTAL HIP ARTHROPLASTY: CPT | Mod: RT

## 2022-10-20 PROCEDURE — 27130 TOTAL HIP ARTHROPLASTY: CPT | Mod: AS,RT

## 2022-10-20 PROCEDURE — 99222 1ST HOSP IP/OBS MODERATE 55: CPT

## 2022-10-20 DEVICE — IMPLANTABLE DEVICE: Type: IMPLANTABLE DEVICE | Status: FUNCTIONAL

## 2022-10-20 DEVICE — CEMENT PALACOS LVG WITH GENTAMICIN: Type: IMPLANTABLE DEVICE | Status: FUNCTIONAL

## 2022-10-20 DEVICE — SCREW BONE SLF TAP 6.5X40MM: Type: IMPLANTABLE DEVICE | Status: FUNCTIONAL

## 2022-10-20 DEVICE — HEAD BIOLOX DELTA 36MM  PLUS 0MM: Type: IMPLANTABLE DEVICE | Status: FUNCTIONAL

## 2022-10-20 DEVICE — SCREW SELF TAP 6.5X30MM: Type: IMPLANTABLE DEVICE | Status: FUNCTIONAL

## 2022-10-20 DEVICE — ZIMMER FEMORAL BONE CEMENT KIT: Type: IMPLANTABLE DEVICE | Status: FUNCTIONAL

## 2022-10-20 RX ORDER — OXYCODONE HYDROCHLORIDE 5 MG/1
5 TABLET ORAL
Refills: 0 | Status: DISCONTINUED | OUTPATIENT
Start: 2022-10-20 | End: 2022-10-25

## 2022-10-20 RX ORDER — SODIUM CHLORIDE 9 MG/ML
500 INJECTION INTRAMUSCULAR; INTRAVENOUS; SUBCUTANEOUS ONCE
Refills: 0 | Status: COMPLETED | OUTPATIENT
Start: 2022-10-20 | End: 2022-10-20

## 2022-10-20 RX ORDER — FENTANYL CITRATE 50 UG/ML
50 INJECTION INTRAVENOUS
Refills: 0 | Status: DISCONTINUED | OUTPATIENT
Start: 2022-10-20 | End: 2022-10-20

## 2022-10-20 RX ORDER — TRANEXAMIC ACID 100 MG/ML
1000 INJECTION, SOLUTION INTRAVENOUS ONCE
Refills: 0 | Status: DISCONTINUED | OUTPATIENT
Start: 2022-10-20 | End: 2022-10-20

## 2022-10-20 RX ORDER — ACETAMINOPHEN 500 MG
650 TABLET ORAL EVERY 6 HOURS
Refills: 0 | Status: DISCONTINUED | OUTPATIENT
Start: 2022-10-20 | End: 2022-10-25

## 2022-10-20 RX ORDER — LEVOTHYROXINE SODIUM 125 MCG
1 TABLET ORAL
Qty: 0 | Refills: 0 | DISCHARGE

## 2022-10-20 RX ORDER — HYDROMORPHONE HYDROCHLORIDE 2 MG/ML
0.5 INJECTION INTRAMUSCULAR; INTRAVENOUS; SUBCUTANEOUS
Refills: 0 | Status: DISCONTINUED | OUTPATIENT
Start: 2022-10-20 | End: 2022-10-20

## 2022-10-20 RX ORDER — ACETAMINOPHEN 500 MG
975 TABLET ORAL EVERY 8 HOURS
Refills: 0 | Status: DISCONTINUED | OUTPATIENT
Start: 2022-10-20 | End: 2022-10-25

## 2022-10-20 RX ORDER — LEVOTHYROXINE SODIUM 125 MCG
150 TABLET ORAL DAILY
Refills: 0 | Status: DISCONTINUED | OUTPATIENT
Start: 2022-10-20 | End: 2022-10-25

## 2022-10-20 RX ORDER — ONDANSETRON 8 MG/1
4 TABLET, FILM COATED ORAL ONCE
Refills: 0 | Status: DISCONTINUED | OUTPATIENT
Start: 2022-10-20 | End: 2022-10-20

## 2022-10-20 RX ORDER — CEFAZOLIN SODIUM 1 G
2000 VIAL (EA) INJECTION ONCE
Refills: 0 | Status: DISCONTINUED | OUTPATIENT
Start: 2022-10-20 | End: 2022-10-20

## 2022-10-20 RX ORDER — OXYCODONE HYDROCHLORIDE 5 MG/1
5 TABLET ORAL ONCE
Refills: 0 | Status: DISCONTINUED | OUTPATIENT
Start: 2022-10-20 | End: 2022-10-20

## 2022-10-20 RX ORDER — ACETAMINOPHEN 500 MG
1000 TABLET ORAL ONCE
Refills: 0 | Status: DISCONTINUED | OUTPATIENT
Start: 2022-10-20 | End: 2022-10-25

## 2022-10-20 RX ORDER — CELECOXIB 200 MG/1
200 CAPSULE ORAL EVERY 12 HOURS
Refills: 0 | Status: DISCONTINUED | OUTPATIENT
Start: 2022-10-22 | End: 2022-10-25

## 2022-10-20 RX ORDER — POLYETHYLENE GLYCOL 3350 17 G/17G
17 POWDER, FOR SOLUTION ORAL AT BEDTIME
Refills: 0 | Status: DISCONTINUED | OUTPATIENT
Start: 2022-10-20 | End: 2022-10-25

## 2022-10-20 RX ORDER — OXYCODONE HYDROCHLORIDE 5 MG/1
10 TABLET ORAL
Refills: 0 | Status: DISCONTINUED | OUTPATIENT
Start: 2022-10-20 | End: 2022-10-25

## 2022-10-20 RX ORDER — SODIUM CHLORIDE 9 MG/ML
3 INJECTION INTRAMUSCULAR; INTRAVENOUS; SUBCUTANEOUS EVERY 8 HOURS
Refills: 0 | Status: DISCONTINUED | OUTPATIENT
Start: 2022-10-20 | End: 2022-10-20

## 2022-10-20 RX ORDER — HYDROXYCHLOROQUINE SULFATE 200 MG
200 TABLET ORAL DAILY
Refills: 0 | Status: DISCONTINUED | OUTPATIENT
Start: 2022-10-20 | End: 2022-10-25

## 2022-10-20 RX ORDER — ACETAMINOPHEN 500 MG
975 TABLET ORAL ONCE
Refills: 0 | Status: COMPLETED | OUTPATIENT
Start: 2022-10-20 | End: 2022-10-20

## 2022-10-20 RX ORDER — ENOXAPARIN SODIUM 100 MG/ML
40 INJECTION SUBCUTANEOUS EVERY 24 HOURS
Refills: 0 | Status: DISCONTINUED | OUTPATIENT
Start: 2022-10-21 | End: 2022-10-24

## 2022-10-20 RX ORDER — LOSARTAN/HYDROCHLOROTHIAZIDE 100MG-25MG
0 TABLET ORAL
Qty: 0 | Refills: 0 | DISCHARGE

## 2022-10-20 RX ORDER — SENNA PLUS 8.6 MG/1
2 TABLET ORAL AT BEDTIME
Refills: 0 | Status: DISCONTINUED | OUTPATIENT
Start: 2022-10-20 | End: 2022-10-25

## 2022-10-20 RX ORDER — ONDANSETRON 8 MG/1
4 TABLET, FILM COATED ORAL EVERY 6 HOURS
Refills: 0 | Status: DISCONTINUED | OUTPATIENT
Start: 2022-10-20 | End: 2022-10-25

## 2022-10-20 RX ORDER — LOSARTAN POTASSIUM 100 MG/1
100 TABLET, FILM COATED ORAL DAILY
Refills: 0 | Status: DISCONTINUED | OUTPATIENT
Start: 2022-10-22 | End: 2022-10-25

## 2022-10-20 RX ORDER — KETOROLAC TROMETHAMINE 30 MG/ML
15 SYRINGE (ML) INJECTION EVERY 6 HOURS
Refills: 0 | Status: DISCONTINUED | OUTPATIENT
Start: 2022-10-20 | End: 2022-10-21

## 2022-10-20 RX ORDER — MAGNESIUM HYDROXIDE 400 MG/1
30 TABLET, CHEWABLE ORAL DAILY
Refills: 0 | Status: DISCONTINUED | OUTPATIENT
Start: 2022-10-20 | End: 2022-10-25

## 2022-10-20 RX ORDER — LEVETIRACETAM 250 MG/1
1 TABLET, FILM COATED ORAL
Qty: 0 | Refills: 0 | DISCHARGE

## 2022-10-20 RX ORDER — PANTOPRAZOLE SODIUM 20 MG/1
40 TABLET, DELAYED RELEASE ORAL
Refills: 0 | Status: DISCONTINUED | OUTPATIENT
Start: 2022-10-20 | End: 2022-10-25

## 2022-10-20 RX ORDER — HYDROMORPHONE HYDROCHLORIDE 2 MG/ML
4 INJECTION INTRAMUSCULAR; INTRAVENOUS; SUBCUTANEOUS EVERY 4 HOURS
Refills: 0 | Status: DISCONTINUED | OUTPATIENT
Start: 2022-10-20 | End: 2022-10-25

## 2022-10-20 RX ORDER — SODIUM CHLORIDE 9 MG/ML
1000 INJECTION INTRAMUSCULAR; INTRAVENOUS; SUBCUTANEOUS
Refills: 0 | Status: DISCONTINUED | OUTPATIENT
Start: 2022-10-20 | End: 2022-10-25

## 2022-10-20 RX ORDER — CELECOXIB 200 MG/1
400 CAPSULE ORAL ONCE
Refills: 0 | Status: COMPLETED | OUTPATIENT
Start: 2022-10-20 | End: 2022-10-20

## 2022-10-20 RX ORDER — LEVETIRACETAM 250 MG/1
250 TABLET, FILM COATED ORAL
Refills: 0 | Status: DISCONTINUED | OUTPATIENT
Start: 2022-10-20 | End: 2022-10-25

## 2022-10-20 RX ORDER — HYDROCHLOROTHIAZIDE 25 MG
25 TABLET ORAL DAILY
Refills: 0 | Status: DISCONTINUED | OUTPATIENT
Start: 2022-10-22 | End: 2022-10-25

## 2022-10-20 RX ORDER — PANTOPRAZOLE SODIUM 20 MG/1
1 TABLET, DELAYED RELEASE ORAL
Qty: 0 | Refills: 0 | DISCHARGE

## 2022-10-20 RX ORDER — CEFAZOLIN SODIUM 1 G
2000 VIAL (EA) INJECTION
Refills: 0 | Status: COMPLETED | OUTPATIENT
Start: 2022-10-20 | End: 2022-10-21

## 2022-10-20 RX ORDER — HYDROXYCHLOROQUINE SULFATE 200 MG
0 TABLET ORAL
Qty: 0 | Refills: 0 | DISCHARGE

## 2022-10-20 RX ORDER — CHOLECALCIFEROL (VITAMIN D3) 125 MCG
0 CAPSULE ORAL
Qty: 0 | Refills: 0 | DISCHARGE

## 2022-10-20 RX ORDER — APREPITANT 80 MG/1
40 CAPSULE ORAL ONCE
Refills: 0 | Status: COMPLETED | OUTPATIENT
Start: 2022-10-20 | End: 2022-10-20

## 2022-10-20 RX ORDER — SODIUM CHLORIDE 9 MG/ML
1000 INJECTION, SOLUTION INTRAVENOUS
Refills: 0 | Status: DISCONTINUED | OUTPATIENT
Start: 2022-10-20 | End: 2022-10-20

## 2022-10-20 RX ADMIN — Medication 975 MILLIGRAM(S): at 22:56

## 2022-10-20 RX ADMIN — APREPITANT 40 MILLIGRAM(S): 80 CAPSULE ORAL at 10:07

## 2022-10-20 RX ADMIN — Medication 975 MILLIGRAM(S): at 21:56

## 2022-10-20 RX ADMIN — OXYCODONE HYDROCHLORIDE 5 MILLIGRAM(S): 5 TABLET ORAL at 10:15

## 2022-10-20 RX ADMIN — HYDROMORPHONE HYDROCHLORIDE 0.5 MILLIGRAM(S): 2 INJECTION INTRAMUSCULAR; INTRAVENOUS; SUBCUTANEOUS at 16:41

## 2022-10-20 RX ADMIN — SODIUM CHLORIDE 500 MILLILITER(S): 9 INJECTION INTRAMUSCULAR; INTRAVENOUS; SUBCUTANEOUS at 17:00

## 2022-10-20 RX ADMIN — Medication 975 MILLIGRAM(S): at 10:07

## 2022-10-20 RX ADMIN — CELECOXIB 400 MILLIGRAM(S): 200 CAPSULE ORAL at 10:07

## 2022-10-20 RX ADMIN — HYDROMORPHONE HYDROCHLORIDE 0.5 MILLIGRAM(S): 2 INJECTION INTRAMUSCULAR; INTRAVENOUS; SUBCUTANEOUS at 16:56

## 2022-10-20 RX ADMIN — Medication 100 MILLIGRAM(S): at 21:55

## 2022-10-20 RX ADMIN — Medication 15 MILLIGRAM(S): at 23:48

## 2022-10-20 RX ADMIN — LEVETIRACETAM 250 MILLIGRAM(S): 250 TABLET, FILM COATED ORAL at 21:56

## 2022-10-20 NOTE — DISCHARGE NOTE PROVIDER - NSDCCPTREATMENT_GEN_ALL_CORE_FT
PRINCIPAL PROCEDURE  Procedure: Right total hip replacement  Findings and Treatment:        PRINCIPAL PROCEDURE  Procedure: Right total hip replacement  Findings and Treatment:       SECONDARY PROCEDURE  Procedure: Left total hip arthroplasty  Findings and Treatment:

## 2022-10-20 NOTE — CONSULT NOTE ADULT - SUBJECTIVE AND OBJECTIVE BOX
71 y/o female with Hx of HTN, Hypothyroidism, Metastatic Melanoma, OA, she has right hip for the last 2 months, now worsening, she came in here for elective right Hip Total Joint replacement by dr. Salazar on 10/20/22 s/p procedure, she has hip pain, denies fever, chills, chest pain, nausea, vomiting.      REVIEW OF SYSTEMS:    CONSTITUTIONAL: No fever, some fatigue  RESPIRATORY: No cough, No shortness of breath  CARDIOVASCULAR: No chest pain, palpitations  GASTROINTESTINAL: No abdominal, No nausea, vomiting  NEUROLOGICAL: No headaches,  loss of strength.  MISCELLANEOUS: Hip pain       Allergies:  	No Known Allergies:     Home Medications:   * Incomplete Medication History as of 04-Oct-2022 17:13 documented in Structured Notes  · 	levothyroxine 150 mcg (0.15 mg) oral tablet: Last Dose Taken:  , 1 tab(s) orally once a day  · 	pantoprazole 40 mg oral delayed release tablet: Last Dose Taken:  , 1 tab(s) orally once a day  · 	Keppra 250 mg oral tablet: Last Dose Taken:  , 1 tab(s) orally 2 times a day  · 	hydrochlorothiazide-losartan: Last Dose Taken:  , orally once a day  · 	hydroxychloroquine: Last Dose Taken:  , orally once a day  · 	Vitamin D3: Last Dose Taken:  , orally once a day  · 	Super B Complex oral tablet: Last Dose Taken:  , 1 tab(s) orally once a day  · 	oxyCODONE 5 mg oral tablet: Last Dose Taken:  , 1 tab(s) orally every 6 hours, As Needed  · 	Tylenol 500 mg oral tablet: 2 tab(s) orally every 6 hours, As Needed      PAST MEDICAL HISTORY:  DVT, lower extremity left 2021, has a valery filter    Hypertension     Hypothyroidism     Melanoma diagnosed in 2016, had an excision, 2021 diagnesed metastatic, brain lesion and different spots in her body, had radiation and immunotherapy and steroids, not on any Rx now. treated at [facility].     PAST SURGICAL HISTORY:  Family history of lung cancer     H/O craniotomy 2013, had a hemorrhage , no more F/U by the neurosurgeon    H/O melanoma excision from the back.     FAMILY HISTORY:  Father  Still living? No  Family history of lung cancer, Age at diagnosis: Age Unknown.      Social History: Not a smoker, drinker or using any drugs    Vital Signs Last 24 Hrs  T(C): 36.7 (20 Oct 2022 16:10), Max: 37 (20 Oct 2022 09:50)  T(F): 98.1 (20 Oct 2022 16:10), Max: 98.6 (20 Oct 2022 09:50)  HR: 66 (20 Oct 2022 18:00) (59 - 88)  BP: 126/69 (20 Oct 2022 18:00) (101/65 - 155/76)  BP(mean): --  RR: 17 (20 Oct 2022 18:00) (12 - 17)  SpO2: 99% (20 Oct 2022 18:00) (97% - 100%)    Parameters below as of 20 Oct 2022 18:00  Patient On (Oxygen Delivery Method): room air        PHYSICAL EXAM:    GENERAL: Elderly female looking comfortable  HEENT: PERRL, +EOMI  NECK: soft, Supple, No JVD  CHEST/LUNG: Clear to auscultate bilaterally; No wheezing  HEART: S1S2+, Regular rate and rhythm; No murmurs  ABDOMEN: Soft, Nontender, Nondistended; Bowel sounds present  EXTREMITIES:  1+ Peripheral Pulses, No edema, Right hip Joint area cover with dressing, no bleeding or soaking   SKIN: No rashes or lesions  NEURO: AAOX3  PSYCH: normal mood      
Patient is a 72y old  Female who presents with a chief complaint of "Right hip replacement" (04 Oct 2022 16:49)      HPI:  72 year old female with PMH of HTN, Hypothyroidism, Metastatic Melanoma here for PST states that she has pain in her right hip fro the last 2 months, reports 10/10 pain with most of the activities, she said she was on steroids for her Brain lesions(from Melanoma)  and it made her arthritis worse, its bone on bone, she is scheduled for a right Hip Total Joint replacement by dr. Salazar on 10/20/22. Covid vaccine series completed, card in chart, covid test is on 10/17/22. Medical Clearance pending  (04 Oct 2022 16:49)        Rx Written	Rx Dispensed	Drug	Quantity	Days Supply	Prescriber Name	Prescriber Mami #	Payment Method	Dispenser  09/29/2022	09/29/2022	oxycodone hcl (ir) 5 mg tablet	56	14	Zach Rhodes	[id number]	Medicare	[id number]  09/15/2022	09/15/2022	oxycodone hcl (ir) 5 mg tablet	56	14	Zach Rhodes	[id number]	Medicare	[id number].    Interval Hx:  Patient seen during rounds in Preop area  home meds reviewed  will follow    Analgesic Dosing for past 24 hours reviewed as below:  acetaminophen     Tablet ..   975 milliGRAM(s) Oral (10-20-22 @ 10:07)    aprepitant   40 milliGRAM(s) Oral (10-20-22 @ 10:07)    celecoxib   400 milliGRAM(s) Oral (10-20-22 @ 10:07)    oxyCODONE    IR   5 milliGRAM(s) Oral (10-20-22 @ 10:15)          T(C): 37 (10-20-22 @ 09:50), Max: 37 (10-20-22 @ 09:50)  HR: 88 (10-20-22 @ 09:50) (88 - 88)  BP: 155/76 (10-20-22 @ 09:50) (155/76 - 155/76)  RR: --  SpO2: 99% (10-20-22 @ 09:50) (99% - 99%)                        Pain Service   [phone]

## 2022-10-20 NOTE — ASU PREOP CHECKLIST - CHLOROHEXIDINE WASH 3
Patient Seen in: [facility]      History   Patient presents with:  Lump Mass    Stated Complaint: neck issue x 5 months    HPI  27-year-old male who states that he has noticed a lump to the right side of his throat and also a burning pain Positive for stated complaint: neck issue x 5 months  Other systems are as noted in HPI. Constitutional and vital signs reviewed. All other systems reviewed and negative except as noted above.     Physical Exam     ED Triage Vitals [10/15/20 4923]   B Chest wall: No tenderness. Lymphadenopathy:      Cervical: No cervical adenopathy. Skin:     General: Skin is warm and dry. Coloration: Skin is not pale. Findings: No erythema or rash.    Neurological:      Mental Status: He is alert and or PROCEDURE:  CT SOFT TISSUE OF NECK (CPT=70490)  COMPARISON:  None. INDICATIONS:  right sided parotid swelling and pain  TECHNIQUE:  Noncontrast CT scanning is performed through the neck soft tissues. Dose reduction techniques were used.  Dose information CONCLUSION:  1. There is mild stranding around the right parotid gland and there is lymphadenopathy level 2 on the right. This is nonspecific and could be inflammatory parotiditis. Correlation with clinical findings is necessary.  2. Irregular periapical Mely 22    Schedule an appointment as soon as possible for a visit in 3 days  for parotid gland swelling          Medications Prescribed:  Current Discharge Medication List    START taking these medications    Am 20-Oct-2022

## 2022-10-20 NOTE — DISCHARGE NOTE PROVIDER - NSDCFUSCHEDAPPT_GEN_ALL_CORE_FT
Randy Salazar  [facility]  ORTHOSURG [address]  Scheduled Appointment: 10/25/2022    Randy Salazar  [facility]  ORTHOSURG [address]  Scheduled Appointment: 11/11/2022    Elebiary, Yeon J  [facility]  ORTHOSURG [address]  Scheduled Appointment: 12/20/2022    Randy Salazar  [facility]  ORTHOSURG [address]  Scheduled Appointment: 01/11/2023     Randy Salazar  [facility]  ORTHOSURG [address]  Scheduled Appointment: 11/11/2022    Elebiary, Yeon J  [facility]  ORTHOSURG [address]  Scheduled Appointment: 12/20/2022    Randy Salazar  [facility]  ORTHOSURG [address]  Scheduled Appointment: 01/11/2023

## 2022-10-20 NOTE — DISCHARGE NOTE PROVIDER - NSDCMRMEDTOKEN_GEN_ALL_CORE_FT
hydrochlorothiazide-losartan: orally once a day  hydroxychloroquine: orally once a day  Keppra 250 mg oral tablet: 1 tab(s) orally 2 times a day  levothyroxine 150 mcg (0.15 mg) oral tablet: 1 tab(s) orally once a day  oxyCODONE 5 mg oral tablet: 1 tab(s) orally every 6 hours, As Needed  pantoprazole 40 mg oral delayed release tablet: 1 tab(s) orally once a day  Super B Complex oral tablet: 1 tab(s) orally once a day  Tylenol 500 mg oral tablet: 2 tab(s) orally every 6 hours, As Needed  Vitamin D3: orally once a day   apixaban 2.5 mg oral tablet: 1 tab(s) orally 2 times a day  celecoxib 200 mg oral capsule: 1 cap(s) orally every 12 hours  hydrochlorothiazide-losartan: orally once a day  hydroxychloroquine: orally once a day  Keppra 250 mg oral tablet: 1 tab(s) orally 2 times a day  levothyroxine 150 mcg (0.15 mg) oral tablet: 1 tab(s) orally once a day  oxyCODONE 5 mg oral tablet: 1 tab(s) orally every 6 hours, As Needed -Mild Pain (1 - 3) MDD:4  pantoprazole 40 mg oral delayed release tablet: 1 tab(s) orally once a day  Senna S 50 mg-8.6 mg oral tablet: 2 tab(s) orally once a day (at bedtime)   Super B Complex oral tablet: 1 tab(s) orally once a day  Tylenol 500 mg oral tablet: 2 tab(s) orally every 6 hours, As Needed  Vitamin D3: orally once a day   apixaban 2.5 mg oral tablet: 1 tab(s) orally 2 times a day  celecoxib 200 mg oral capsule: 1 cap(s) orally every 12 hours  hydrochlorothiazide-losartan: orally once a day  hydroxychloroquine: orally once a day  Keppra 250 mg oral tablet: 1 tab(s) orally 2 times a day  levothyroxine 150 mcg (0.15 mg) oral tablet: 1 tab(s) orally once a day  oxyCODONE 5 mg oral tablet: 1 tab(s) orally every 6 hours, As Needed -Mild Pain (1 - 3) MDD:4  pantoprazole 40 mg oral delayed release tablet: 1 tab(s) orally once a day  Senna S 50 mg-8.6 mg oral tablet: 2 tab(s) orally once a day (at bedtime)   Super B Complex oral tablet: 1 tab(s) orally once a day  Vitamin D3: orally once a day

## 2022-10-20 NOTE — PHYSICAL THERAPY INITIAL EVALUATION ADULT - GENERAL OBSERVATIONS, REHAB EVAL
Pt received on 2BRK, pt ok for PT by NATHAN Gunter. xray read + confirmed implant in proper alignment and no fxs/dislocations. pt observed semi-wright in bed with XX, pleasant, cooperative, A&O and c/o XX/10 pain t/o eval. Pt received on 2BRK, pt ok for PT by NATHAN Gunter. xray read + confirmed implant in proper alignment and no fxs/dislocations. pt observed semi-wright in bed with IV lock, abduction pillow, pleasant, cooperative, A&O and c/o 5/10 right LE pain t/o eval.

## 2022-10-20 NOTE — DISCHARGE NOTE PROVIDER - HOSPITAL COURSE
The patient underwent a RIGHT POSTERIOR TOTAL HIP REPLACEMENT on 10/20/2022. The patient received antibiotics consistent with SCIP guidelines. The patient underwent the procedure and had no intra-operative complications. Post-operatively, the patient was seen by medicine and PT. The patient received LOVENOX for DVTP. The patient received pain medications per orthopedic pain management pathway and the pain was appropriately controlled. Patient was instructed on posterior total hip precautions by PT. The patient did not have any post-operative medical complications. The patient was discharged in stable condition. The patient underwent a RIGHT POSTERIOR TOTAL HIP REPLACEMENT on 10/20/2022. The patient received antibiotics consistent with SCIP guidelines. The patient underwent the procedure and had no intra-operative complications. Post-operatively, the patient was seen by medicine and PT. The patient received LOVENOX/ Eliquis for DVTP. The patient received pain medications per orthopedic pain management pathway and the pain was appropriately controlled. Patient was instructed on posterior total hip precautions by PT. The patient did not have any post-operative medical complications. The patient was discharged in stable condition. The patient underwent a RIGHT POSTERIOR TOTAL HIP REPLACEMENT on 10/20/2022 and LEFT POSTERIOR TOTAL HIP REPLACEMENT ON 10/25. The patient received antibiotics consistent with SCIP guidelines. The patient underwent the procedure and had no intra-operative complications. Post-operatively, the patient was seen by medicine and PT. The patient received LOVENOX/ Eliquis for DVTP. The patient received pain medications per orthopedic pain management pathway and the pain was appropriately controlled. Patient was instructed on posterior total hip precautions by PT. The patient did not have any post-operative medical complications. The patient was discharged in stable condition.

## 2022-10-20 NOTE — DISCHARGE NOTE PROVIDER - NSDCCPCAREPLAN_GEN_ALL_CORE_FT
PRINCIPAL DISCHARGE DIAGNOSIS  Diagnosis: Primary osteoarthritis of right hip  Assessment and Plan of Treatment:        PRINCIPAL DISCHARGE DIAGNOSIS  Diagnosis: Bilateral hip joint arthritis  Assessment and Plan of Treatment:

## 2022-10-20 NOTE — DISCHARGE NOTE PROVIDER - NSDCFUADDINST_GEN_ALL_CORE_FT
The patient will be seen in the office between 2-3 weeks for wound check.   **Your first post-operative visit has been scheduled prior to your admission. PLEASE CONTACT OFFICE TO CONFIRM THE APPOINTMENT DATE. Tape will be removed at that time.  **  The silver based dressing is to be removed 7 days from the date of surgery.   ** CONTACT THE OFFICE IF THE FOLLOWING DEVELOP:  - the dressing becomes soiled or saturated  - you develop a fever greater that 101F  - the wound becomes red or you develop blistering around the wound  * Patient may shower after post-op day #3.   * The patient will continue home PT consistent with posterior total hip replacement protocol and will continue to practice posterior total hip precautions for a minimum of 6 week. Transition to outpatient PT will occur at the time of the first office visit.   * The patient is FULL weight bearing.  * The patient will continue ASPIRIN for 6 weeks after surgery for blood clot prevention.  *** The patient will continue LOVENOX for 2 weeks and then begin ASPIRIN for blood clot prevention. *** While on aspirin, the patient will take daily omeprazole or other similar medication to protect the stomach from irritation.   * The patient will take OXYCODONE AND TYLENOL for pain control and adjust according to prescription and patient needs. Contact the office if pain increases while taking prescribed pain medications or related concerns develop.  * Celebrex will be taken twice daily for 3 weeks for pain control and prevention of excessive bone growth. Additional prescription may be requested at your office follow-up visit.  * The patient will take Senna S while taking oxycodone to prevent narcotic associated constipation.  Additionally, increase water intake (drink at least 8 glasses of water daily) and try adding fiber to the diet by eating fruits, vegetables and foods that are rich in grains. If constipation is experienced, contact the medical/primary care provider to discuss further treatment options.  * To avoid injury at home:  - continue use of rolling walker until cleared by physical therapist  - have family or friend remove all throw rug or objects in hallways that may present a trip hazard.  - if you experience any dizziness or medical concerns, call your medical doctor or  911.  * The implant may activate metal detection devices.  The patient will be seen in the office between 2-3 weeks for wound check.   **Your first post-operative visit has been scheduled prior to your admission. PLEASE CONTACT OFFICE TO CONFIRM THE APPOINTMENT DATE. Tape will be removed at that time.  **  The silver based dressings are to be removed 7 days from the date of surgeries.   ** CONTACT THE OFFICE IF THE FOLLOWING DEVELOP:  - the dressing becomes soiled or saturated  - you develop a fever greater that 101F  - the wound becomes red or you develop blistering around the wound  * Patient may shower after post-op day #3.   * The patient will continue home PT consistent with posterior total hip replacement protocol and will continue to practice posterior total hip precautions for a minimum of 6 week. Transition to outpatient PT will occur at the time of the first office visit.   * The patient is FULL weight bearing.  * The patient will continue Eliquis for 35 days after surgery for blood clot prevention.   * The patient will take OXYCODONE AND TYLENOL for pain control and adjust according to prescription and patient needs. Contact the office if pain increases while taking prescribed pain medications or related concerns develop.  * Celebrex will be taken twice daily for 3 weeks for pain control and prevention of excessive bone growth. Additional prescription may be requested at your office follow-up visit.  * The patient will take Senna S while taking oxycodone to prevent narcotic associated constipation.  Additionally, increase water intake (drink at least 8 glasses of water daily) and try adding fiber to the diet by eating fruits, vegetables and foods that are rich in grains. If constipation is experienced, contact the medical/primary care provider to discuss further treatment options.  * To avoid injury at home:  - continue use of rolling walker until cleared by physical therapist  - have family or friend remove all throw rug or objects in hallways that may present a trip hazard.  - if you experience any dizziness or medical concerns, call your medical doctor or  911.  * The implant may activate metal detection devices.  The patient will be seen in the office between 2-3 weeks for wound check.   **Your first post-operative visit has been scheduled prior to your admission. PLEASE CONTACT OFFICE TO CONFIRM THE APPOINTMENT DATE. Tape will be removed at that time.  **  The silver based dressings are to be removed 7 days from the date of surgeries (RIGHT HIP 10/28 and LEFT HIP 11/1).   ** CONTACT THE OFFICE IF THE FOLLOWING DEVELOP:  - the dressing becomes soiled or saturated  - you develop a fever greater that 101F  - the wound becomes red or you develop blistering around the wound  * Patient may shower after post-op day #3.   * The patient will continue home PT consistent with posterior total hip replacement protocol and will continue to practice posterior total hip precautions for a minimum of 6 week. Transition to outpatient PT will occur at the time of the first office visit.   * The patient is FULL weight bearing.  * The patient will continue Eliquis for 35 days after surgery for blood clot prevention.   * The patient will take OXYCODONE AND TYLENOL for pain control and adjust according to prescription and patient needs. Contact the office if pain increases while taking prescribed pain medications or related concerns develop.  * Celebrex will be taken twice daily for 3 weeks for pain control and prevention of excessive bone growth. Additional prescription may be requested at your office follow-up visit.  * The patient will take Senna S while taking oxycodone to prevent narcotic associated constipation.  Additionally, increase water intake (drink at least 8 glasses of water daily) and try adding fiber to the diet by eating fruits, vegetables and foods that are rich in grains. If constipation is experienced, contact the medical/primary care provider to discuss further treatment options.  * To avoid injury at home:  - continue use of rolling walker until cleared by physical therapist  - have family or friend remove all throw rug or objects in hallways that may present a trip hazard.  - if you experience any dizziness or medical concerns, call your medical doctor or  911.  * The implant may activate metal detection devices.

## 2022-10-20 NOTE — DISCHARGE NOTE PROVIDER - CARE PROVIDER_API CALL
Randy Salazar)  Orthopaedic Surgery  [address]  [place] [address]  Phone: [phone]  Fax: [phone]  Follow Up Time:

## 2022-10-20 NOTE — PROGRESS NOTE ADULT - SUBJECTIVE AND OBJECTIVE BOX
Ortho Post Op Check    Name: NEHA ROBERTS  MR #: [id number]    Procedure: right total hip arthroplasty   Surgeon: Martin    Pt endorsing discomfort to right hip as expected, pain controlled. States prior to today she was mainly nonambulatory secondary to bilateral hip pain  Denies CP, SOB, N/V, numbness/tingling     General Exam:  Vital Signs Last 24 Hrs  T(C): 36.4 (10-20-22 @ 19:11), Max: 36.7 (10-20-22 @ 16:10)  T(F): 97.5 (10-20-22 @ 19:11), Max: 98.1 (10-20-22 @ 16:10)  HR: 67 (10-20-22 @ 19:11) (59 - 67)  BP: 142/72 (10-20-22 @ 19:11) (101/65 - 146/63)  BP(mean): --  RR: 18 (10-20-22 @ 19:11) (12 - 18)  SpO2: 99% (10-20-22 @ 19:11) (97% - 100%)    General: Pt Alert and oriented, NAD, controlled pain. +abduction pillow  Dressings C/D/I. No bleeding.  Pulses: 2+ dorsalis pedis pulse. Cap refill < 2 sec.  Sensation: Grossly intact to light touch without deficit.  Motor: + EHL/FHL/TA/GS      A/P: 72yFemale POD#0 s/p right posterior total hip arthroplasty     - planned for LEFT total hip arthroplasty on 10/25  - Pain Control  - DVT ppx: Lovenox until 10/24  - Post op abx: as per SCIP  - PT/OT  - Weight bearing status: WBAT RLE  - posterior precautions

## 2022-10-20 NOTE — DISCHARGE NOTE PROVIDER - NSDCACTIVITY_GEN_ALL_CORE
Follow Instructions Provided by your Surgical Team Do not drive or operate machinery/Stairs allowed/Walking - Indoors allowed/No heavy lifting/straining/Walking - Outdoors allowed/Follow Instructions Provided by your Surgical Team

## 2022-10-20 NOTE — CONSULT NOTE ADULT - ASSESSMENT
72F  chronic hip pain on oxy 5 qid prn  due for THR today    - Recommend starting oxycodone PO 5mg/10mg [id number] PRN mod/severe pain  - Recommend starting hydromorphone IVP 0.5mg q3hour PRN breakthrough pain  - Recommend starting acetaminophen PO 975mg [id number] standing. HOLD for liver function test dysfunction  - If no contraindication to NSAIDs, recommend starting ketorolac IV 30mg [id number] standing x 4 doses. Afterwards, can use celebrex 200mg PO q12h x 7days, with food. HOLD for black/red stools.
71 y/o female with Hx of HTN, Hypothyroidism, Metastatic Melanoma, OA, she has right hip for the last 2 months, now worsening, she came in here for elective right Hip Total Joint replacement by dr. Salazar on 10/20/22 s/p procedure.     Plan:     OA of the Right hip s/p THR post op day 0:     - post op no complications  - VS stable  - abx per ortho   - c/w anti hypertensive to be restarted post op day 02 except if blood pressure goes >150 systolic   - c/w IVF x 24 hrs then reassess per ortho  - opiate induced constipation regimen   - encouraging incentive spirometry   -c/w local wound care per ortho   -DVT prophylaxis and Pain meds as per Ortho team   -PT/OT and weight bearing per ortho       Hypothyroidism: levothyroxine 150 mcg once a day    GERD: Will continue with pantoprazole 40 mg once a day    Seizure prophylaxis: On Keppra 250 mg 2 times a day,    HTN: On hydrochlorothiazide-losartan: once a day.     RA: will continue with hydroxychloroquine once a day.     Would recommend Lovenox for DVT prophylaxis as she is high risk because of her hx of cancer.

## 2022-10-21 LAB
ANION GAP SERPL CALC-SCNC: 10 MMOL/L — SIGNIFICANT CHANGE UP (ref 5–17)
BUN SERPL-MCNC: 18.8 MG/DL — SIGNIFICANT CHANGE UP (ref 8–20)
CALCIUM SERPL-MCNC: 8.5 MG/DL — SIGNIFICANT CHANGE UP (ref 8.4–10.5)
CHLORIDE SERPL-SCNC: 99 MMOL/L — SIGNIFICANT CHANGE UP (ref 98–107)
CO2 SERPL-SCNC: 26 MMOL/L — SIGNIFICANT CHANGE UP (ref 22–29)
CREAT SERPL-MCNC: 1.14 MG/DL — SIGNIFICANT CHANGE UP (ref 0.5–1.3)
EGFR: 52 ML/MIN/1.73M2 — LOW
GLUCOSE SERPL-MCNC: 113 MG/DL — HIGH (ref 70–99)
HCT VFR BLD CALC: 28.7 % — LOW (ref 34.5–45)
HGB BLD-MCNC: 9.5 G/DL — LOW (ref 11.5–15.5)
MCHC RBC-ENTMCNC: 31.1 PG — SIGNIFICANT CHANGE UP (ref 27–34)
MCHC RBC-ENTMCNC: 33.1 GM/DL — SIGNIFICANT CHANGE UP (ref 32–36)
MCV RBC AUTO: 94.1 FL — SIGNIFICANT CHANGE UP (ref 80–100)
PLATELET # BLD AUTO: 351 K/UL — SIGNIFICANT CHANGE UP (ref 150–400)
POTASSIUM SERPL-MCNC: 4 MMOL/L — SIGNIFICANT CHANGE UP (ref 3.5–5.3)
POTASSIUM SERPL-SCNC: 4 MMOL/L — SIGNIFICANT CHANGE UP (ref 3.5–5.3)
RBC # BLD: 3.05 M/UL — LOW (ref 3.8–5.2)
RBC # FLD: 12.6 % — SIGNIFICANT CHANGE UP (ref 10.3–14.5)
SODIUM SERPL-SCNC: 135 MMOL/L — SIGNIFICANT CHANGE UP (ref 135–145)
WBC # BLD: 11.01 K/UL — HIGH (ref 3.8–10.5)
WBC # FLD AUTO: 11.01 K/UL — HIGH (ref 3.8–10.5)

## 2022-10-21 PROCEDURE — 99232 SBSQ HOSP IP/OBS MODERATE 35: CPT

## 2022-10-21 RX ADMIN — Medication 150 MICROGRAM(S): at 05:11

## 2022-10-21 RX ADMIN — Medication 975 MILLIGRAM(S): at 06:28

## 2022-10-21 RX ADMIN — OXYCODONE HYDROCHLORIDE 5 MILLIGRAM(S): 5 TABLET ORAL at 20:57

## 2022-10-21 RX ADMIN — Medication 15 MILLIGRAM(S): at 05:12

## 2022-10-21 RX ADMIN — LEVETIRACETAM 250 MILLIGRAM(S): 250 TABLET, FILM COATED ORAL at 17:55

## 2022-10-21 RX ADMIN — OXYCODONE HYDROCHLORIDE 5 MILLIGRAM(S): 5 TABLET ORAL at 21:45

## 2022-10-21 RX ADMIN — Medication 975 MILLIGRAM(S): at 13:30

## 2022-10-21 RX ADMIN — Medication 15 MILLIGRAM(S): at 00:36

## 2022-10-21 RX ADMIN — PANTOPRAZOLE SODIUM 40 MILLIGRAM(S): 20 TABLET, DELAYED RELEASE ORAL at 05:12

## 2022-10-21 RX ADMIN — ENOXAPARIN SODIUM 40 MILLIGRAM(S): 100 INJECTION SUBCUTANEOUS at 05:11

## 2022-10-21 RX ADMIN — LEVETIRACETAM 250 MILLIGRAM(S): 250 TABLET, FILM COATED ORAL at 05:11

## 2022-10-21 RX ADMIN — POLYETHYLENE GLYCOL 3350 17 GRAM(S): 17 POWDER, FOR SOLUTION ORAL at 21:04

## 2022-10-21 RX ADMIN — SENNA PLUS 2 TABLET(S): 8.6 TABLET ORAL at 21:04

## 2022-10-21 RX ADMIN — Medication 1 TABLET(S): at 12:34

## 2022-10-21 RX ADMIN — Medication 975 MILLIGRAM(S): at 05:11

## 2022-10-21 RX ADMIN — Medication 15 MILLIGRAM(S): at 06:28

## 2022-10-21 RX ADMIN — Medication 100 MILLIGRAM(S): at 05:11

## 2022-10-21 RX ADMIN — Medication 200 MILLIGRAM(S): at 12:34

## 2022-10-21 RX ADMIN — Medication 975 MILLIGRAM(S): at 12:34

## 2022-10-21 NOTE — PROGRESS NOTE ADULT - SUBJECTIVE AND OBJECTIVE BOX
NEHA ROBERTS    [id number]    History: Patient is status post right posterior total hip arthroplasty on 10/20/2022, pod #1. Patient is doing well. The patient's pain is controlled using the prescribed pain medications. The patient is participating in physical therapy. Denies nausea, vomiting, chest pain, shortness of breath, abdominal pain or fever. No new complaints.                              9.5    11.01 )-----------( 351      ( 21 Oct 2022 06:20 )             28.7     10-21    135  |  99  |  18.8  ----------------------------<  113<H>  4.0   |  26.0  |  1.14    Ca    8.5      21 Oct 2022 06:20            Physical exam: The right hip dressing is clean, dry and intact. No drainage or discharge. No erythema is noted. No blistering. No ecchymosis. The calf is supple nontender. No calf tenderness. Sensation to light touch is grossly intact distally. Motor function distally is 5/5. No foot drop. 2+ dorsalis pedis pulse. Capillary refill is less than 2 seconds. No cyanosis.    Primary Orthopedic Assessment:  • s/p RIGHT POSTERIOR total hip replacement pod #1    Plan:   • DVT prophylaxis lovenox, including use of compression devices and ankle pumps  • Continue physical therapy  • Weightbearing as tolerated of the right lower extremity with assistance of a walker  • Incentive spirometry encouraged  • Pain control as clinically indicated  • Posterior hip precautions reviewed with patient  • Pt will be preop on Monday for Left RAVI on Tuesday 10/25 with Dr. Salazar

## 2022-10-21 NOTE — PROGRESS NOTE ADULT - ASSESSMENT
73 y/o female with Hx of HTN, Hypothyroidism, Metastatic Melanoma, OA, she has right hip for the last 2 months, now worsening, she came in here for elective right Hip Total Joint replacement by dr. Salazar on 10/20/22 s/p procedure.     Plan:     OA of the Right hip s/p THR post op day 01:     - post op no complications  - VS stable  - abx per ortho   - opiate induced constipation regimen   - encouraging incentive spirometry   -c/w local wound care per ortho   -DVT prophylaxis and Pain meds as per Ortho team   -PT/OT and weight bearing per ortho   Encourage oral hydration.     Left hip OA: plan to have surgery on 10/25/22    Hypothyroidism: levothyroxine 150 mcg once a day    GERD: Will continue with pantoprazole 40 mg once a day    HTN: On hydrochlorothiazide-losartan: once a day.     RA: will continue with hydroxychloroquine once a day.     Hx of Metastatic melanoma: s/p brain radiation, Seizure prophylaxis: On Keppra 250 mg 2 times a day    Would recommend Lovenox for DVT prophylaxis as she is high risk because of her hx of cancer.     Leucocytosis: Likely post op, no focus of infection, no fever, no chills.     Acute blood loss anemia due to procedure: Iron supplement

## 2022-10-21 NOTE — PROGRESS NOTE ADULT - SUBJECTIVE AND OBJECTIVE BOX
NEHA ALEJANDRA    [id number]    72y      Female    Patient is a 72y old  Female who presents with a chief complaint of RAVI (20 Oct 2022 18:39)      INTERVAL HPI/OVERNIGHT EVENTS:      Patient is doing ok, pain is well managed with pain medications, working well with PT, has no fever, chills, chest pain, SOB, nausea, vomiting, constipation.     REVIEW OF SYSTEMS:    CONSTITUTIONAL: No fever, some fatigue  RESPIRATORY: No cough, No shortness of breath  CARDIOVASCULAR: No chest pain, palpitations  GASTROINTESTINAL: No abdominal, No nausea, vomiting  NEUROLOGICAL: No headaches,  loss of strength.  MISCELLANEOUS: Hip pain is well controlled       Vital Signs Last 24 Hrs  T(C): 36.8 (21 Oct 2022 10:03), Max: 36.8 (21 Oct 2022 10:03)  T(F): 98.2 (21 Oct 2022 10:03), Max: 98.2 (21 Oct 2022 10:03)  HR: 89 (21 Oct 2022 10:03) (59 - 89)  BP: 110/71 (21 Oct 2022 10:03) (101/65 - 146/63)  RR: 18 (21 Oct 2022 10:03) (12 - 18)  SpO2: 93% (21 Oct 2022 10:03) (93% - 100%)    Parameters below as of 21 Oct 2022 10:03  Patient On (Oxygen Delivery Method): room air        PHYSICAL EXAM:    GENERAL: Elderly female looking comfortable  HEENT: PERRL, +EOMI  NECK: soft, Supple, No JVD  CHEST/LUNG: Clear to auscultate bilaterally; No wheezing  HEART: S1S2+, Regular rate and rhythm; No murmurs  ABDOMEN: Soft, Nontender, Nondistended; Bowel sounds present  EXTREMITIES:  1+ Peripheral Pulses, No edema, Right hip Joint area cover with dressing, no bleeding or soaking   SKIN: No rashes or lesions  NEURO: AAOX3  PSYCH: normal mood      LABS:                        9.5    11.01 )-----------( 351      ( 21 Oct 2022 06:20 )             28.7     10-21    135  |  99  |  18.8  ----------------------------<  113<H>  4.0   |  26.0  |  1.14    Ca    8.5      21 Oct 2022 06:20              I&O's Summary    20 Oct 2022 07:01  -  21 Oct 2022 07:00  --------------------------------------------------------  IN: 0 mL / OUT: 350 mL / NET: -350 mL        MEDICATIONS  (STANDING):  acetaminophen     Tablet .. 975 milliGRAM(s) Oral every 8 hours  acetaminophen   IVPB .. 1000 milliGRAM(s) IV Intermittent once  enoxaparin Injectable 40 milliGRAM(s) SubCutaneous every 24 hours  hydroxychloroquine 200 milliGRAM(s) Oral daily  ketorolac   Injectable 15 milliGRAM(s) IV Push every 6 hours  levETIRAcetam 250 milliGRAM(s) Oral two times a day  levothyroxine 150 MICROGram(s) Oral daily  multivitamin 1 Tablet(s) Oral daily  pantoprazole    Tablet 40 milliGRAM(s) Oral before breakfast  polyethylene glycol 3350 17 Gram(s) Oral at bedtime  senna 2 Tablet(s) Oral at bedtime  sodium chloride 0.9%. 1000 milliLiter(s) (100 mL/Hr) IV Continuous <Continuous>    MEDICATIONS  (PRN):  acetaminophen     Tablet .. 650 milliGRAM(s) Oral every 6 hours PRN Temp greater or equal to 38C (100.4F)  HYDROmorphone   Tablet 4 milliGRAM(s) Oral every 4 hours PRN Severe Pain (7 - 10)  magnesium hydroxide Suspension 30 milliLiter(s) Oral daily PRN Constipation  ondansetron Injectable 4 milliGRAM(s) IV Push every 6 hours PRN Nausea and/or Vomiting  oxyCODONE    IR 5 milliGRAM(s) Oral every 3 hours PRN Mild Pain (1 - 3)  oxyCODONE    IR 10 milliGRAM(s) Oral every 3 hours PRN Moderate Pain (4 - 6)

## 2022-10-22 PROCEDURE — 99232 SBSQ HOSP IP/OBS MODERATE 35: CPT

## 2022-10-22 RX ADMIN — Medication 200 MILLIGRAM(S): at 14:04

## 2022-10-22 RX ADMIN — SENNA PLUS 2 TABLET(S): 8.6 TABLET ORAL at 22:41

## 2022-10-22 RX ADMIN — CELECOXIB 200 MILLIGRAM(S): 200 CAPSULE ORAL at 05:41

## 2022-10-22 RX ADMIN — OXYCODONE HYDROCHLORIDE 5 MILLIGRAM(S): 5 TABLET ORAL at 14:04

## 2022-10-22 RX ADMIN — Medication 25 MILLIGRAM(S): at 05:11

## 2022-10-22 RX ADMIN — LOSARTAN POTASSIUM 100 MILLIGRAM(S): 100 TABLET, FILM COATED ORAL at 05:12

## 2022-10-22 RX ADMIN — PANTOPRAZOLE SODIUM 40 MILLIGRAM(S): 20 TABLET, DELAYED RELEASE ORAL at 05:15

## 2022-10-22 RX ADMIN — CELECOXIB 200 MILLIGRAM(S): 200 CAPSULE ORAL at 05:11

## 2022-10-22 RX ADMIN — Medication 1 TABLET(S): at 14:05

## 2022-10-22 RX ADMIN — CELECOXIB 200 MILLIGRAM(S): 200 CAPSULE ORAL at 18:17

## 2022-10-22 RX ADMIN — OXYCODONE HYDROCHLORIDE 5 MILLIGRAM(S): 5 TABLET ORAL at 22:56

## 2022-10-22 RX ADMIN — OXYCODONE HYDROCHLORIDE 5 MILLIGRAM(S): 5 TABLET ORAL at 21:56

## 2022-10-22 RX ADMIN — CELECOXIB 200 MILLIGRAM(S): 200 CAPSULE ORAL at 19:16

## 2022-10-22 RX ADMIN — Medication 150 MICROGRAM(S): at 05:12

## 2022-10-22 RX ADMIN — OXYCODONE HYDROCHLORIDE 5 MILLIGRAM(S): 5 TABLET ORAL at 01:53

## 2022-10-22 RX ADMIN — LEVETIRACETAM 250 MILLIGRAM(S): 250 TABLET, FILM COATED ORAL at 18:17

## 2022-10-22 RX ADMIN — LEVETIRACETAM 250 MILLIGRAM(S): 250 TABLET, FILM COATED ORAL at 05:17

## 2022-10-22 RX ADMIN — OXYCODONE HYDROCHLORIDE 5 MILLIGRAM(S): 5 TABLET ORAL at 15:04

## 2022-10-22 RX ADMIN — ENOXAPARIN SODIUM 40 MILLIGRAM(S): 100 INJECTION SUBCUTANEOUS at 05:11

## 2022-10-22 RX ADMIN — OXYCODONE HYDROCHLORIDE 5 MILLIGRAM(S): 5 TABLET ORAL at 02:30

## 2022-10-22 RX ADMIN — POLYETHYLENE GLYCOL 3350 17 GRAM(S): 17 POWDER, FOR SOLUTION ORAL at 22:41

## 2022-10-22 NOTE — PROGRESS NOTE ADULT - ASSESSMENT
73 y/o female with Hx of HTN, Hypothyroidism, Metastatic Melanoma, OA, she has right hip for the last 2 months, now worsening, she came in here for elective right Hip Total Joint replacement by dr. Salazar on 10/20/22 s/p procedure.     Plan:     OA of the Right hip s/p THR post op day 02:     - post op no complications  - VS stable  - abx per ortho   - opiate induced constipation regimen   - encouraging incentive spirometry   -c/w local wound care per ortho   -DVT prophylaxis and Pain meds as per Ortho team   -PT/OT and weight bearing per ortho   Encourage oral hydration.     Left hip OA: plan to have surgery on 10/25/22    Hypothyroidism: levothyroxine 150 mcg once a day    GERD: Will continue with pantoprazole 40 mg once a day    HTN: On hydrochlorothiazide-losartan: once a day.     RA: will continue with hydroxychloroquine once a day.     Hx of Metastatic melanoma: s/p brain radiation, Seizure prophylaxis: On Keppra 250 mg 2 times a day    Would recommend Lovenox for DVT prophylaxis as she is high risk because of her hx of cancer.     Leucocytosis: Likely post op, no focus of infection, no fever, no chills.     Acute blood loss anemia due to procedure: Iron supplement.     Continue with current plan

## 2022-10-22 NOTE — PROGRESS NOTE ADULT - SUBJECTIVE AND OBJECTIVE BOX
NEHA ALEJANDRA    [id number]    72y      Female    Patient is a 72y old  Female who presents with a chief complaint of RAVI (20 Oct 2022 18:39)      INTERVAL HPI/OVERNIGHT EVENTS:    has no complains, pain well controlled, no fever, chills, chest pain or sob     REVIEW OF SYSTEMS:    CONSTITUTIONAL: No fever, some fatigue  RESPIRATORY: No cough, No shortness of breath  CARDIOVASCULAR: No chest pain, palpitations  GASTROINTESTINAL: No abdominal, No nausea, vomiting  NEUROLOGICAL: No headaches,  loss of strength.  MISCELLANEOUS: Hip pain is well controlled       Vital Signs Last 24 Hrs  T(C): 36.7 (22 Oct 2022 05:03), Max: 37 (21 Oct 2022 21:28)  T(F): 98.1 (22 Oct 2022 05:03), Max: 98.6 (21 Oct 2022 21:28)  HR: 87 (22 Oct 2022 05:03) (86 - 89)  BP: 127/78 (22 Oct 2022 05:03) (110/71 - 132/74)  RR: 18 (22 Oct 2022 05:03) (18 - 18)  SpO2: 97% (22 Oct 2022 05:03) (92% - 97%)    Parameters below as of 22 Oct 2022 05:03  Patient On (Oxygen Delivery Method): room air        PHYSICAL EXAM:      GENERAL: Elderly female looking comfortable  HEENT: PERRL, +EOMI  NECK: soft, Supple, No JVD  CHEST/LUNG: Clear to auscultate bilaterally; No wheezing  HEART: S1S2+, Regular rate and rhythm; No murmurs  ABDOMEN: Soft, Nontender, Nondistended; Bowel sounds present  EXTREMITIES:  1+ Peripheral Pulses, No edema, Right hip Joint area cover with dressing, no bleeding or soaking   SKIN: No rashes or lesions  NEURO: AAOX3  PSYCH: normal mood      LABS:                        9.5    11.01 )-----------( 351      ( 21 Oct 2022 06:20 )             28.7     10-21    135  |  99  |  18.8  ----------------------------<  113<H>  4.0   |  26.0  |  1.14    Ca    8.5      21 Oct 2022 06:20              I&O's Summary      MEDICATIONS  (STANDING):  acetaminophen     Tablet .. 975 milliGRAM(s) Oral every 8 hours  acetaminophen   IVPB .. 1000 milliGRAM(s) IV Intermittent once  celecoxib 200 milliGRAM(s) Oral every 12 hours  enoxaparin Injectable 40 milliGRAM(s) SubCutaneous every 24 hours  hydrochlorothiazide 25 milliGRAM(s) Oral daily  hydroxychloroquine 200 milliGRAM(s) Oral daily  levETIRAcetam 250 milliGRAM(s) Oral two times a day  levothyroxine 150 MICROGram(s) Oral daily  losartan 100 milliGRAM(s) Oral daily  multivitamin 1 Tablet(s) Oral daily  pantoprazole    Tablet 40 milliGRAM(s) Oral before breakfast  polyethylene glycol 3350 17 Gram(s) Oral at bedtime  senna 2 Tablet(s) Oral at bedtime  sodium chloride 0.9%. 1000 milliLiter(s) (100 mL/Hr) IV Continuous <Continuous>    MEDICATIONS  (PRN):  acetaminophen     Tablet .. 650 milliGRAM(s) Oral every 6 hours PRN Temp greater or equal to 38C (100.4F)  HYDROmorphone   Tablet 4 milliGRAM(s) Oral every 4 hours PRN Severe Pain (7 - 10)  magnesium hydroxide Suspension 30 milliLiter(s) Oral daily PRN Constipation  ondansetron Injectable 4 milliGRAM(s) IV Push every 6 hours PRN Nausea and/or Vomiting  oxyCODONE    IR 5 milliGRAM(s) Oral every 3 hours PRN Mild Pain (1 - 3)  oxyCODONE    IR 10 milliGRAM(s) Oral every 3 hours PRN Moderate Pain (4 - 6)

## 2022-10-22 NOTE — PROGRESS NOTE ADULT - SUBJECTIVE AND OBJECTIVE BOX
NEHA ROBERTS    [id number]    Patient seen and examined status post right posterior total hip arthroplasty, POD # 2. Patient is doing well. The patient's pain is controlled using the prescribed pain medications. The patient is participating in physical therapy, has only ambulated short distance. Denies nausea, vomiting, chest pain, shortness of breath, abdominal pain, LE N/T. No new complaints.    Vital Signs Last 24 Hrs  T(C): 36.7 (22 Oct 2022 05:03), Max: 37 (21 Oct 2022 21:28)  T(F): 98.1 (22 Oct 2022 05:03), Max: 98.6 (21 Oct 2022 21:28)  HR: 87 (22 Oct 2022 05:03) (86 - 89)  BP: 127/78 (22 Oct 2022 05:03) (110/71 - 132/74)  BP(mean): --  RR: 18 (22 Oct 2022 05:03) (18 - 18)  SpO2: 97% (22 Oct 2022 05:03) (92% - 97%)    Parameters below as of 22 Oct 2022 05:03  Patient On (Oxygen Delivery Method): room air                              9.5    11.01 )-----------( 351      ( 21 Oct 2022 06:20 )             28.7     10-21    135  |  99  |  18.8  ----------------------------<  113<H>  4.0   |  26.0  |  1.14    Ca    8.5      21 Oct 2022 06:20        MEDICATIONS  (STANDING):  acetaminophen     Tablet .. 975 milliGRAM(s) Oral every 8 hours  acetaminophen   IVPB .. 1000 milliGRAM(s) IV Intermittent once  celecoxib 200 milliGRAM(s) Oral every 12 hours  enoxaparin Injectable 40 milliGRAM(s) SubCutaneous every 24 hours  hydrochlorothiazide 25 milliGRAM(s) Oral daily  hydroxychloroquine 200 milliGRAM(s) Oral daily  levETIRAcetam 250 milliGRAM(s) Oral two times a day  levothyroxine 150 MICROGram(s) Oral daily  losartan 100 milliGRAM(s) Oral daily  multivitamin 1 Tablet(s) Oral daily  pantoprazole    Tablet 40 milliGRAM(s) Oral before breakfast  polyethylene glycol 3350 17 Gram(s) Oral at bedtime  senna 2 Tablet(s) Oral at bedtime  sodium chloride 0.9%. 1000 milliLiter(s) (100 mL/Hr) IV Continuous <Continuous>    MEDICATIONS  (PRN):  acetaminophen     Tablet .. 650 milliGRAM(s) Oral every 6 hours PRN Temp greater or equal to 38C (100.4F)  HYDROmorphone   Tablet 4 milliGRAM(s) Oral every 4 hours PRN Severe Pain (7 - 10)  magnesium hydroxide Suspension 30 milliLiter(s) Oral daily PRN Constipation  ondansetron Injectable 4 milliGRAM(s) IV Push every 6 hours PRN Nausea and/or Vomiting  oxyCODONE    IR 5 milliGRAM(s) Oral every 3 hours PRN Mild Pain (1 - 3)  oxyCODONE    IR 10 milliGRAM(s) Oral every 3 hours PRN Moderate Pain (4 - 6)      Physical exam: The right hip dressing is clean, dry and intact. No drainage or discharge. No erythema is noted. No blistering. No ecchymosis. The calf is supple nontender. Passive range of motion is acceptable to due postoperative pain. No calf tenderness. Sensation to light touch is grossly intact distally. Motor function distally is 5/5. No foot drop. 2+ dorsalis pedis pulse. Capillary refill is less than 2 seconds. No cyanosis.    Primary Orthopedic Assessment:  • s/p RIGHT POSTERIOR total hip replacement, POD # 2  patient is scheduled for left total hip arthroplasty 10/25      Plan:   . plan for OR 10/25 for left total hip arthroplasty   • DVT prophylaxis: Lovenox 40mg qd,  use of compression devices and ankle pumps while in bed  • Continue physical therapy: encourage OOB and ambulation  • Weightbearing as tolerated of the right lower extremity with assistance of a walker  • Incentive spirometry encouraged  • Pain control as clinically indicated  • Posterior hip precautions reviewed with patient  • Discharge planning

## 2022-10-23 PROCEDURE — 99232 SBSQ HOSP IP/OBS MODERATE 35: CPT

## 2022-10-23 RX ADMIN — LEVETIRACETAM 250 MILLIGRAM(S): 250 TABLET, FILM COATED ORAL at 05:12

## 2022-10-23 RX ADMIN — CELECOXIB 200 MILLIGRAM(S): 200 CAPSULE ORAL at 17:18

## 2022-10-23 RX ADMIN — ENOXAPARIN SODIUM 40 MILLIGRAM(S): 100 INJECTION SUBCUTANEOUS at 05:11

## 2022-10-23 RX ADMIN — Medication 150 MICROGRAM(S): at 05:12

## 2022-10-23 RX ADMIN — Medication 200 MILLIGRAM(S): at 11:35

## 2022-10-23 RX ADMIN — LOSARTAN POTASSIUM 100 MILLIGRAM(S): 100 TABLET, FILM COATED ORAL at 05:14

## 2022-10-23 RX ADMIN — OXYCODONE HYDROCHLORIDE 5 MILLIGRAM(S): 5 TABLET ORAL at 11:37

## 2022-10-23 RX ADMIN — CELECOXIB 200 MILLIGRAM(S): 200 CAPSULE ORAL at 05:17

## 2022-10-23 RX ADMIN — CELECOXIB 200 MILLIGRAM(S): 200 CAPSULE ORAL at 17:54

## 2022-10-23 RX ADMIN — Medication 25 MILLIGRAM(S): at 05:14

## 2022-10-23 RX ADMIN — CELECOXIB 200 MILLIGRAM(S): 200 CAPSULE ORAL at 05:11

## 2022-10-23 RX ADMIN — Medication 1 TABLET(S): at 11:35

## 2022-10-23 RX ADMIN — OXYCODONE HYDROCHLORIDE 5 MILLIGRAM(S): 5 TABLET ORAL at 12:07

## 2022-10-23 RX ADMIN — LEVETIRACETAM 250 MILLIGRAM(S): 250 TABLET, FILM COATED ORAL at 17:18

## 2022-10-23 RX ADMIN — PANTOPRAZOLE SODIUM 40 MILLIGRAM(S): 20 TABLET, DELAYED RELEASE ORAL at 05:12

## 2022-10-23 NOTE — PROGRESS NOTE ADULT - SUBJECTIVE AND OBJECTIVE BOX
Ortho Post Op Check    Name: NEHA ROBERTS    MR #: [id number]    Procedure: Right posterior total hip arthroplasty   Surgeon: Dr Salazar    Pt comfortable without complaints, pain controlled  Denies CP, SOB, N/V, numbness/tingling       PAST MEDICAL & SURGICAL HISTORY:  Hypertension      Hypothyroidism      Melanoma  diagnosed in 2016, had an excision, 2021 diagnesed metastatic, brain lesion and different spots in her body, had radiation and immunotherapy and steroids, not on any Rx now. [place] [facility]      DVT, lower extremity  left 2021, has a valery filter      H/O melanoma excision  from the back      H/O craniotomy  2013, had a hemorrhage , no more F/U by the neurosurgeon      Family history of lung cancer          General Exam:  Vital Signs Last 24 Hrs  T(C): 36.9 (10-23-22 @ 05:16), Max: 36.9 (10-23-22 @ 05:16)  T(F): 98.4 (10-23-22 @ 05:16), Max: 98.4 (10-23-22 @ 05:16)  HR: 75 (10-23-22 @ 05:16) (75 - 75)  BP: 125/76 (10-23-22 @ 05:16) (125/76 - 125/76)  BP(mean): --  RR: 18 (10-23-22 @ 05:16) (18 - 18)  SpO2: 96% (10-23-22 @ 05:16) (96% - 96%)    General: Pt Alert and oriented, NAD, controlled pain.  Right hip Dressings C/D/I. No bleeding.  Pulses: 2+ dorsalis pedis pulse. Cap refill < 2 sec.  Sensation: Grossly intact to light touch without deficit.  Motor: + EHL/FHL/TA/GS            A/P: 72yFemale POD#3 s/p Right Posterior total hip arthroplasty           Plan for return to OR 10/25 for Left total hip arthroplasty   • DVT prophylaxis: Lovenox 40mg qd,  use of compression devices and ankle pumps while in bed  • Continue physical therapy: encourage OOB and ambulation  • Weightbearing as tolerated of the right lower extremity with assistance of a walker POSTERIOR precautions   • Incentive spirometry encouraged  • Pain control as clinically indicated  • Posterior hip precautions reviewed with patient

## 2022-10-23 NOTE — PROGRESS NOTE ADULT - SUBJECTIVE AND OBJECTIVE BOX
NEHA ROBERTS    [id number]    72y      Female    Patient is a 72y old  Female who presents with a chief complaint of RAVI (20 Oct 2022 18:39)      INTERVAL HPI/OVERNIGHT EVENTS:    has no complains, pain well controlled, no fever, chills, chest pain or sob, no overnight issues     REVIEW OF SYSTEMS:    CONSTITUTIONAL: No fever, some fatigue  RESPIRATORY: No cough, No shortness of breath  CARDIOVASCULAR: No chest pain, palpitations  GASTROINTESTINAL: No abdominal, No nausea, vomiting  NEUROLOGICAL: No headaches,  loss of strength.  MISCELLANEOUS: Hip pain is well controlled        Vital Signs Last 24 Hrs  T(C): 36.8 (23 Oct 2022 11:15), Max: 36.9 (23 Oct 2022 05:16)  T(F): 98.2 (23 Oct 2022 11:15), Max: 98.4 (23 Oct 2022 05:16)  HR: 76 (23 Oct 2022 11:15) (75 - 80)  BP: 126/79 (23 Oct 2022 11:15) (107/64 - 128/73)  RR: 18 (23 Oct 2022 11:15) (18 - 18)  SpO2: 97% (23 Oct 2022 11:15) (95% - 97%)    Parameters below as of 23 Oct 2022 11:15  Patient On (Oxygen Delivery Method): room air        PHYSICAL EXAM:    GENERAL: Elderly female looking comfortable  HEENT: PERRL, +EOMI  NECK: soft, Supple, No JVD  CHEST/LUNG: Clear to auscultate bilaterally; No wheezing  HEART: S1S2+, Regular rate and rhythm; No murmurs  ABDOMEN: Soft, Nontender, Nondistended; Bowel sounds present  EXTREMITIES:  1+ Peripheral Pulses, No edema, Right hip Joint area cover with dressing, no bleeding or soaking   SKIN: No rashes or lesions  NEURO: AAOX3  PSYCH: normal mood          MEDICATIONS  (STANDING):  acetaminophen     Tablet .. 975 milliGRAM(s) Oral every 8 hours  acetaminophen   IVPB .. 1000 milliGRAM(s) IV Intermittent once  celecoxib 200 milliGRAM(s) Oral every 12 hours  enoxaparin Injectable 40 milliGRAM(s) SubCutaneous every 24 hours  hydrochlorothiazide 25 milliGRAM(s) Oral daily  hydroxychloroquine 200 milliGRAM(s) Oral daily  levETIRAcetam 250 milliGRAM(s) Oral two times a day  levothyroxine 150 MICROGram(s) Oral daily  losartan 100 milliGRAM(s) Oral daily  multivitamin 1 Tablet(s) Oral daily  pantoprazole    Tablet 40 milliGRAM(s) Oral before breakfast  polyethylene glycol 3350 17 Gram(s) Oral at bedtime  senna 2 Tablet(s) Oral at bedtime  sodium chloride 0.9%. 1000 milliLiter(s) (100 mL/Hr) IV Continuous <Continuous>    MEDICATIONS  (PRN):  acetaminophen     Tablet .. 650 milliGRAM(s) Oral every 6 hours PRN Temp greater or equal to 38C (100.4F)  HYDROmorphone   Tablet 4 milliGRAM(s) Oral every 4 hours PRN Severe Pain (7 - 10)  magnesium hydroxide Suspension 30 milliLiter(s) Oral daily PRN Constipation  ondansetron Injectable 4 milliGRAM(s) IV Push every 6 hours PRN Nausea and/or Vomiting  oxyCODONE    IR 5 milliGRAM(s) Oral every 3 hours PRN Mild Pain (1 - 3)  oxyCODONE    IR 10 milliGRAM(s) Oral every 3 hours PRN Moderate Pain (4 - 6)

## 2022-10-23 NOTE — OCCUPATIONAL THERAPY INITIAL EVALUATION ADULT - PRECAUTIONS/LIMITATIONS, REHAB EVAL
fall precautions/surgical precautions posterior/fall precautions/right hip precautions/surgical precautions

## 2022-10-23 NOTE — PROGRESS NOTE ADULT - ASSESSMENT
71 y/o female with Hx of HTN, Hypothyroidism, Metastatic Melanoma, OA, she has right hip for the last 2 months, now worsening, she came in here for elective right Hip Total Joint replacement by dr. Salazar on 10/20/22 s/p procedure.     Plan:     OA of the Right hip s/p THR post op day 03:     - post op no complications  - VS stable  - abx per ortho   - opiate induced constipation regimen   - encouraging incentive spirometry   -c/w local wound care per ortho   -DVT prophylaxis and Pain meds as per Ortho team   -PT/OT and weight bearing per ortho   Encourage oral hydration.     Left hip OA: plan to have surgery on 10/25/22    Hypothyroidism: levothyroxine 150 mcg once a day    GERD: Will continue with pantoprazole 40 mg once a day    HTN: On hydrochlorothiazide-losartan: once a day.     RA: will continue with hydroxychloroquine once a day.     Hx of Metastatic melanoma: s/p brain radiation, Seizure prophylaxis: On Keppra 250 mg 2 times a day    Would recommend Lovenox for DVT prophylaxis as she is high risk because of her hx of cancer.     Leucocytosis: Likely post op, no focus of infection, no fever, no chills.     Acute blood loss anemia due to procedure: Iron supplement.     Continue with current plan.

## 2022-10-23 NOTE — OCCUPATIONAL THERAPY INITIAL EVALUATION ADULT - GENERAL OBSERVATIONS, REHAB EVAL
pt received in bed and left as found, c/o pain 3/10, pt pleasant, motivated and following all commands

## 2022-10-23 NOTE — OCCUPATIONAL THERAPY INITIAL EVALUATION ADULT - PERTINENT HX OF CURRENT PROBLEM, REHAB EVAL
elective R THR 2/2 AVN. pt stated that she's currently undergoing cancer treatment for melanoma with mets to brain and 1 week after starting immunotherapy, pt began to experience difficulty with mobility, denies injury/trauma. upon f/u at [facility], pt was diagnosed with bilateral hip AVN x 5 weeks ago. pt is pending L THR on 10/25/22

## 2022-10-23 NOTE — OCCUPATIONAL THERAPY INITIAL EVALUATION ADULT - MANUAL MUSCLE TESTING RESULTS, REHAB EVAL
bilateral UE 4/5, except right shoudler,/no strength deficits were identified bilateral UE 4/5 bilateral/no strength deficits were identified

## 2022-10-23 NOTE — OCCUPATIONAL THERAPY INITIAL EVALUATION ADULT - ADDITIONAL COMMENTS
lives with spouse, with no CALVIN + 1 flight to 2nd floor bed&bath however, pt has been residing on the ground floor, as pt has not been able to tolerate negotiating stairs x 4 weeks PTA. pt has been minimally ambulatory x 4 weeks PTA 2/2 increased pain and crepitus, spouse has assisted pt with all transfers/mobility/ADLs. pt has RW and commode DME at home.

## 2022-10-23 NOTE — OCCUPATIONAL THERAPY INITIAL EVALUATION ADULT - LOWER BODY DRESSING, ASSISTIVE DEVICE, OT EVAL
practiced and educated on use of hip kit and educated on compensatory dressing techniques,/dressing stick/elastic laces/long-handled shoe horn/reacher/sock-aid

## 2022-10-23 NOTE — OCCUPATIONAL THERAPY INITIAL EVALUATION ADULT - RANGE OF MOTION EXAMINATION, UPPER EXTREMITY
exception right shoulder approx 50*/bilateral UE Active ROM was WFL  (within functional limits) bilateral UE Active ROM was WFL  (within functional limits)

## 2022-10-24 ENCOUNTER — TRANSCRIPTION ENCOUNTER (OUTPATIENT)
Age: 72
End: 2022-10-24

## 2022-10-24 PROBLEM — E03.9 HYPOTHYROIDISM, UNSPECIFIED: Chronic | Status: ACTIVE | Noted: 2022-10-04

## 2022-10-24 PROBLEM — I10 ESSENTIAL (PRIMARY) HYPERTENSION: Chronic | Status: ACTIVE | Noted: 2022-10-04

## 2022-10-24 PROBLEM — C43.9 MALIGNANT MELANOMA OF SKIN, UNSPECIFIED: Chronic | Status: ACTIVE | Noted: 2022-10-04

## 2022-10-24 PROBLEM — I82.409 ACUTE EMBOLISM AND THROMBOSIS OF UNSPECIFIED DEEP VEINS OF UNSPECIFIED LOWER EXTREMITY: Chronic | Status: ACTIVE | Noted: 2022-10-04

## 2022-10-24 LAB
ANION GAP SERPL CALC-SCNC: 11 MMOL/L — SIGNIFICANT CHANGE UP (ref 5–17)
APTT BLD: 30 SEC — SIGNIFICANT CHANGE UP (ref 27.5–35.5)
BLD GP AB SCN SERPL QL: SIGNIFICANT CHANGE UP
BUN SERPL-MCNC: 10.8 MG/DL — SIGNIFICANT CHANGE UP (ref 8–20)
CALCIUM SERPL-MCNC: 8.8 MG/DL — SIGNIFICANT CHANGE UP (ref 8.4–10.5)
CHLORIDE SERPL-SCNC: 97 MMOL/L — LOW (ref 98–107)
CO2 SERPL-SCNC: 26 MMOL/L — SIGNIFICANT CHANGE UP (ref 22–29)
CREAT SERPL-MCNC: 0.64 MG/DL — SIGNIFICANT CHANGE UP (ref 0.5–1.3)
EGFR: 95 ML/MIN/1.73M2 — SIGNIFICANT CHANGE UP
GLUCOSE BLDC GLUCOMTR-MCNC: 97 MG/DL — SIGNIFICANT CHANGE UP (ref 70–99)
GLUCOSE SERPL-MCNC: 119 MG/DL — HIGH (ref 70–99)
HCT VFR BLD CALC: 29.6 % — LOW (ref 34.5–45)
HGB BLD-MCNC: 9.5 G/DL — LOW (ref 11.5–15.5)
INR BLD: 1.18 RATIO — HIGH (ref 0.88–1.16)
MCHC RBC-ENTMCNC: 29.9 PG — SIGNIFICANT CHANGE UP (ref 27–34)
MCHC RBC-ENTMCNC: 32.1 GM/DL — SIGNIFICANT CHANGE UP (ref 32–36)
MCV RBC AUTO: 93.1 FL — SIGNIFICANT CHANGE UP (ref 80–100)
PLATELET # BLD AUTO: 402 K/UL — HIGH (ref 150–400)
POTASSIUM SERPL-MCNC: 3.6 MMOL/L — SIGNIFICANT CHANGE UP (ref 3.5–5.3)
POTASSIUM SERPL-SCNC: 3.6 MMOL/L — SIGNIFICANT CHANGE UP (ref 3.5–5.3)
PROTHROM AB SERPL-ACNC: 13.7 SEC — HIGH (ref 10.5–13.4)
RBC # BLD: 3.18 M/UL — LOW (ref 3.8–5.2)
RBC # FLD: 12.4 % — SIGNIFICANT CHANGE UP (ref 10.3–14.5)
SARS-COV-2 RNA SPEC QL NAA+PROBE: SIGNIFICANT CHANGE UP
SODIUM SERPL-SCNC: 134 MMOL/L — LOW (ref 135–145)
WBC # BLD: 10.26 K/UL — SIGNIFICANT CHANGE UP (ref 3.8–10.5)
WBC # FLD AUTO: 10.26 K/UL — SIGNIFICANT CHANGE UP (ref 3.8–10.5)

## 2022-10-24 PROCEDURE — 99232 SBSQ HOSP IP/OBS MODERATE 35: CPT

## 2022-10-24 RX ORDER — TRANEXAMIC ACID 100 MG/ML
1000 INJECTION, SOLUTION INTRAVENOUS ONCE
Refills: 0 | Status: DISCONTINUED | OUTPATIENT
Start: 2022-10-25 | End: 2022-10-25

## 2022-10-24 RX ORDER — CEFAZOLIN SODIUM 1 G
2000 VIAL (EA) INJECTION ONCE
Refills: 0 | Status: DISCONTINUED | OUTPATIENT
Start: 2022-10-25 | End: 2022-10-25

## 2022-10-24 RX ORDER — INFLUENZA VIRUS VACCINE 15; 15; 15; 15 UG/.5ML; UG/.5ML; UG/.5ML; UG/.5ML
0.7 SUSPENSION INTRAMUSCULAR ONCE
Refills: 0 | Status: DISCONTINUED | OUTPATIENT
Start: 2022-10-24 | End: 2022-10-27

## 2022-10-24 RX ORDER — SODIUM CHLORIDE 9 MG/ML
1000 INJECTION INTRAMUSCULAR; INTRAVENOUS; SUBCUTANEOUS
Refills: 0 | Status: DISCONTINUED | OUTPATIENT
Start: 2022-10-24 | End: 2022-10-25

## 2022-10-24 RX ADMIN — Medication 150 MICROGRAM(S): at 06:34

## 2022-10-24 RX ADMIN — OXYCODONE HYDROCHLORIDE 5 MILLIGRAM(S): 5 TABLET ORAL at 21:35

## 2022-10-24 RX ADMIN — CELECOXIB 200 MILLIGRAM(S): 200 CAPSULE ORAL at 17:10

## 2022-10-24 RX ADMIN — ONDANSETRON 4 MILLIGRAM(S): 8 TABLET, FILM COATED ORAL at 09:25

## 2022-10-24 RX ADMIN — SENNA PLUS 2 TABLET(S): 8.6 TABLET ORAL at 21:35

## 2022-10-24 RX ADMIN — PANTOPRAZOLE SODIUM 40 MILLIGRAM(S): 20 TABLET, DELAYED RELEASE ORAL at 06:33

## 2022-10-24 RX ADMIN — OXYCODONE HYDROCHLORIDE 5 MILLIGRAM(S): 5 TABLET ORAL at 02:00

## 2022-10-24 RX ADMIN — OXYCODONE HYDROCHLORIDE 5 MILLIGRAM(S): 5 TABLET ORAL at 02:30

## 2022-10-24 RX ADMIN — LOSARTAN POTASSIUM 100 MILLIGRAM(S): 100 TABLET, FILM COATED ORAL at 06:37

## 2022-10-24 RX ADMIN — LEVETIRACETAM 250 MILLIGRAM(S): 250 TABLET, FILM COATED ORAL at 17:10

## 2022-10-24 RX ADMIN — ENOXAPARIN SODIUM 40 MILLIGRAM(S): 100 INJECTION SUBCUTANEOUS at 06:37

## 2022-10-24 RX ADMIN — OXYCODONE HYDROCHLORIDE 5 MILLIGRAM(S): 5 TABLET ORAL at 22:44

## 2022-10-24 RX ADMIN — LEVETIRACETAM 250 MILLIGRAM(S): 250 TABLET, FILM COATED ORAL at 06:33

## 2022-10-24 RX ADMIN — Medication 1 TABLET(S): at 13:25

## 2022-10-24 RX ADMIN — Medication 25 MILLIGRAM(S): at 09:25

## 2022-10-24 RX ADMIN — Medication 200 MILLIGRAM(S): at 15:32

## 2022-10-24 RX ADMIN — OXYCODONE HYDROCHLORIDE 5 MILLIGRAM(S): 5 TABLET ORAL at 15:33

## 2022-10-24 RX ADMIN — CELECOXIB 200 MILLIGRAM(S): 200 CAPSULE ORAL at 06:33

## 2022-10-24 RX ADMIN — OXYCODONE HYDROCHLORIDE 5 MILLIGRAM(S): 5 TABLET ORAL at 16:07

## 2022-10-24 NOTE — PROGRESS NOTE ADULT - SUBJECTIVE AND OBJECTIVE BOX
NEHA ROBERTS    [id number]    72y      Female    Patient is a 72y old  Female who presents with a chief complaint of RAVI (20 Oct 2022 18:39)      INTERVAL HPI/OVERNIGHT EVENTS:    has no complains, pain well controlled, no fever, chills, chest pain or sob, no overnight issues     REVIEW OF SYSTEMS:    CONSTITUTIONAL: No fever, some fatigue  RESPIRATORY: No cough, No shortness of breath  CARDIOVASCULAR: No chest pain, palpitations  GASTROINTESTINAL: No abdominal, No nausea, vomiting  NEUROLOGICAL: No headaches,  loss of strength.  MISCELLANEOUS: Hip pain is well controlled           Vital Signs Last 24 Hrs  T(C): 36.6 (24 Oct 2022 04:54), Max: 36.8 (23 Oct 2022 11:15)  T(F): 97.9 (24 Oct 2022 04:54), Max: 98.2 (23 Oct 2022 11:15)  HR: 76 (24 Oct 2022 06:30) (67 - 76)  BP: 133/83 (24 Oct 2022 04:54) (126/79 - 133/83)  RR: 18 (24 Oct 2022 04:54) (18 - 18)  SpO2: 91% (24 Oct 2022 04:54) (91% - 98%)    Parameters below as of 24 Oct 2022 04:54  Patient On (Oxygen Delivery Method): room air        PHYSICAL EXAM:    GENERAL: Elderly female looking comfortable  HEENT: PERRL, +EOMI  NECK: soft, Supple, No JVD  CHEST/LUNG: Clear to auscultate bilaterally; No wheezing  HEART: S1S2+, Regular rate and rhythm; No murmurs  ABDOMEN: Soft, Nontender, Nondistended; Bowel sounds present  EXTREMITIES:  1+ Peripheral Pulses, No edema, Right hip Joint area cover with dressing, no bleeding or soaking   SKIN: No rashes or lesions  NEURO: AAOX3  PSYCH: normal mood      LABS:                        9.5    10.26 )-----------( 402      ( 24 Oct 2022 08:52 )             29.6                   I&O's Summary      MEDICATIONS  (STANDING):  acetaminophen     Tablet .. 975 milliGRAM(s) Oral every 8 hours  acetaminophen   IVPB .. 1000 milliGRAM(s) IV Intermittent once  celecoxib 200 milliGRAM(s) Oral every 12 hours  hydrochlorothiazide 25 milliGRAM(s) Oral daily  hydroxychloroquine 200 milliGRAM(s) Oral daily  levETIRAcetam 250 milliGRAM(s) Oral two times a day  levothyroxine 150 MICROGram(s) Oral daily  losartan 100 milliGRAM(s) Oral daily  multivitamin 1 Tablet(s) Oral daily  pantoprazole    Tablet 40 milliGRAM(s) Oral before breakfast  polyethylene glycol 3350 17 Gram(s) Oral at bedtime  senna 2 Tablet(s) Oral at bedtime  sodium chloride 0.9%. 1000 milliLiter(s) (100 mL/Hr) IV Continuous <Continuous>  sodium chloride 0.9%. 1000 milliLiter(s) (100 mL/Hr) IV Continuous <Continuous>    MEDICATIONS  (PRN):  acetaminophen     Tablet .. 650 milliGRAM(s) Oral every 6 hours PRN Temp greater or equal to 38C (100.4F)  HYDROmorphone   Tablet 4 milliGRAM(s) Oral every 4 hours PRN Severe Pain (7 - 10)  magnesium hydroxide Suspension 30 milliLiter(s) Oral daily PRN Constipation  ondansetron Injectable 4 milliGRAM(s) IV Push every 6 hours PRN Nausea and/or Vomiting  oxyCODONE    IR 5 milliGRAM(s) Oral every 3 hours PRN Mild Pain (1 - 3)  oxyCODONE    IR 10 milliGRAM(s) Oral every 3 hours PRN Moderate Pain (4 - 6)

## 2022-10-24 NOTE — PROGRESS NOTE ADULT - SUBJECTIVE AND OBJECTIVE BOX
History: Patient is status post RIGHT posterior total hip arthroplasty on 10/20, POD # 4.   Patient is doing well and is comfortable.   The patient's pain is controlled using the prescribed pain medications.   The patient is participating in physical therapy.   Denies nausea, vomiting, chest pain, shortness of breath, abdominal pain or fever. No new complaints.    Vital Signs Last 24 Hrs  T(C): 36.6 (24 Oct 2022 04:54), Max: 36.8 (23 Oct 2022 11:15)  T(F): 97.9 (24 Oct 2022 04:54), Max: 98.2 (23 Oct 2022 11:15)  HR: 76 (24 Oct 2022 06:30) (67 - 76)  BP: 133/83 (24 Oct 2022 04:54) (126/79 - 133/83)  BP(mean): --  RR: 18 (24 Oct 2022 04:54) (18 - 18)  SpO2: 91% (24 Oct 2022 04:54) (91% - 98%)    Parameters below as of 24 Oct 2022 04:54  Patient On (Oxygen Delivery Method): room air      Physical exam:   The right hip dressing is clean, dry and intact. No drainage or discharge.   The calf is supple nontender. Passive range of motion is acceptable to due postoperative pain. No calf tenderness.   Sensation to light touch is grossly intact distally.   Motor function distally is 5/5. No foot drop.   2+ dorsalis pedis pulse. Capillary refill is less than 2 seconds. No cyanosis.    Primary Orthopedic Assessment:  • s/p RIGHT Posterior total hip replacement    Plan:   • PLAN FOR OR TOMORROW 10/25 with Dr. Salazar for LEFT total hip arthroplasty, D/c [facility]  • Preop: Type and screen, covid swab, NPO after midnight, preop labs  • DVT prophylaxis as prescribed, including use of compression devices and ankle pumps  • Continue physical therapy  • Weightbearing as tolerated of the RIGHT lower extremity with assistance of a walker  • Incentive spirometry encouraged  • Pain control as clinically indicated  • Posterior hip precautions reviewed with patient

## 2022-10-24 NOTE — PROGRESS NOTE ADULT - ASSESSMENT
71 y/o female with Hx of HTN, Hypothyroidism, Metastatic Melanoma, OA, she has right hip for the last 2 months, now worsening, she came in here for elective right Hip Total Joint replacement by dr. Salazar on 10/20/22 s/p procedure.     Plan:     OA of the Right hip s/p THR post op day 04:     - post op no complications  - VS stable  - abx per ortho   - opiate induced constipation regimen   - encouraging incentive spirometry   -c/w local wound care per ortho   -DVT prophylaxis and Pain meds as per Ortho team   -PT/OT and weight bearing per ortho   -Encourage oral hydration.     Left hip OA: plan to have surgery on 10/25/22, NPO mid night    Hypothyroidism: levothyroxine 150 mcg once a day    GERD: Will continue with pantoprazole 40 mg once a day    HTN: On hydrochlorothiazide-losartan: once a day.     RA: will continue with hydroxychloroquine once a day.     Hx of Metastatic melanoma: s/p brain radiation, Seizure prophylaxis: On Keppra 250 mg 2 times a day    Would recommend Lovenox for DVT prophylaxis as she is high risk because of her hx of cancer.     Leucocytosis: Likely post op, no focus of infection, no fever, no chills.     Acute blood loss anemia due to procedure: Iron supplement.     Continue with current plan.

## 2022-10-25 ENCOUNTER — APPOINTMENT (OUTPATIENT)
Dept: ORTHOPEDIC SURGERY | Facility: HOSPITAL | Age: 72
End: 2022-10-25

## 2022-10-25 LAB
GLUCOSE BLDC GLUCOMTR-MCNC: 103 MG/DL — HIGH (ref 70–99)
GLUCOSE BLDC GLUCOMTR-MCNC: 103 MG/DL — HIGH (ref 70–99)
GLUCOSE BLDC GLUCOMTR-MCNC: 89 MG/DL — SIGNIFICANT CHANGE UP (ref 70–99)

## 2022-10-25 PROCEDURE — 27130 TOTAL HIP ARTHROPLASTY: CPT | Mod: LT

## 2022-10-25 PROCEDURE — 27130 TOTAL HIP ARTHROPLASTY: CPT | Mod: AS,LT

## 2022-10-25 PROCEDURE — 99232 SBSQ HOSP IP/OBS MODERATE 35: CPT

## 2022-10-25 RX ORDER — SENNA PLUS 8.6 MG/1
2 TABLET ORAL AT BEDTIME
Refills: 0 | Status: DISCONTINUED | OUTPATIENT
Start: 2022-10-25 | End: 2022-10-27

## 2022-10-25 RX ORDER — ONDANSETRON 8 MG/1
4 TABLET, FILM COATED ORAL ONCE
Refills: 0 | Status: COMPLETED | OUTPATIENT
Start: 2022-10-25 | End: 2022-10-25

## 2022-10-25 RX ORDER — KETOROLAC TROMETHAMINE 30 MG/ML
15 SYRINGE (ML) INJECTION EVERY 6 HOURS
Refills: 0 | Status: DISCONTINUED | OUTPATIENT
Start: 2022-10-25 | End: 2022-10-26

## 2022-10-25 RX ORDER — OXYCODONE HYDROCHLORIDE 5 MG/1
5 TABLET ORAL
Refills: 0 | Status: DISCONTINUED | OUTPATIENT
Start: 2022-10-25 | End: 2022-10-27

## 2022-10-25 RX ORDER — PANTOPRAZOLE SODIUM 20 MG/1
40 TABLET, DELAYED RELEASE ORAL
Refills: 0 | Status: DISCONTINUED | OUTPATIENT
Start: 2022-10-25 | End: 2022-10-27

## 2022-10-25 RX ORDER — ACETAMINOPHEN 500 MG
975 TABLET ORAL EVERY 8 HOURS
Refills: 0 | Status: DISCONTINUED | OUTPATIENT
Start: 2022-10-25 | End: 2022-10-26

## 2022-10-25 RX ORDER — CEFAZOLIN SODIUM 1 G
2000 VIAL (EA) INJECTION
Refills: 0 | Status: COMPLETED | OUTPATIENT
Start: 2022-10-25 | End: 2022-10-26

## 2022-10-25 RX ORDER — SODIUM CHLORIDE 9 MG/ML
500 INJECTION INTRAMUSCULAR; INTRAVENOUS; SUBCUTANEOUS ONCE
Refills: 0 | Status: COMPLETED | OUTPATIENT
Start: 2022-10-25 | End: 2022-10-25

## 2022-10-25 RX ORDER — CELECOXIB 200 MG/1
400 CAPSULE ORAL ONCE
Refills: 0 | Status: COMPLETED | OUTPATIENT
Start: 2022-10-25 | End: 2022-10-25

## 2022-10-25 RX ORDER — LEVOTHYROXINE SODIUM 125 MCG
150 TABLET ORAL DAILY
Refills: 0 | Status: DISCONTINUED | OUTPATIENT
Start: 2022-10-25 | End: 2022-10-27

## 2022-10-25 RX ORDER — HYDROMORPHONE HYDROCHLORIDE 2 MG/ML
0.5 INJECTION INTRAMUSCULAR; INTRAVENOUS; SUBCUTANEOUS
Refills: 0 | Status: DISCONTINUED | OUTPATIENT
Start: 2022-10-25 | End: 2022-10-25

## 2022-10-25 RX ORDER — MAGNESIUM HYDROXIDE 400 MG/1
30 TABLET, CHEWABLE ORAL DAILY
Refills: 0 | Status: DISCONTINUED | OUTPATIENT
Start: 2022-10-25 | End: 2022-10-27

## 2022-10-25 RX ORDER — APIXABAN 2.5 MG/1
2.5 TABLET, FILM COATED ORAL
Refills: 0 | Status: DISCONTINUED | OUTPATIENT
Start: 2022-10-25 | End: 2022-10-27

## 2022-10-25 RX ORDER — HYDROMORPHONE HYDROCHLORIDE 2 MG/ML
4 INJECTION INTRAMUSCULAR; INTRAVENOUS; SUBCUTANEOUS
Refills: 0 | Status: DISCONTINUED | OUTPATIENT
Start: 2022-10-25 | End: 2022-10-27

## 2022-10-25 RX ORDER — MORPHINE SULFATE 50 MG/1
2 CAPSULE, EXTENDED RELEASE ORAL
Refills: 0 | Status: DISCONTINUED | OUTPATIENT
Start: 2022-10-25 | End: 2022-10-25

## 2022-10-25 RX ORDER — HYDROCHLOROTHIAZIDE 25 MG
25 TABLET ORAL DAILY
Refills: 0 | Status: DISCONTINUED | OUTPATIENT
Start: 2022-10-27 | End: 2022-10-27

## 2022-10-25 RX ORDER — LOSARTAN POTASSIUM 100 MG/1
100 TABLET, FILM COATED ORAL DAILY
Refills: 0 | Status: DISCONTINUED | OUTPATIENT
Start: 2022-10-27 | End: 2022-10-27

## 2022-10-25 RX ORDER — SODIUM CHLORIDE 9 MG/ML
1000 INJECTION, SOLUTION INTRAVENOUS
Refills: 0 | Status: DISCONTINUED | OUTPATIENT
Start: 2022-10-25 | End: 2022-10-25

## 2022-10-25 RX ORDER — SODIUM CHLORIDE 9 MG/ML
1000 INJECTION INTRAMUSCULAR; INTRAVENOUS; SUBCUTANEOUS
Refills: 0 | Status: DISCONTINUED | OUTPATIENT
Start: 2022-10-25 | End: 2022-10-27

## 2022-10-25 RX ORDER — CELECOXIB 200 MG/1
200 CAPSULE ORAL EVERY 12 HOURS
Refills: 0 | Status: DISCONTINUED | OUTPATIENT
Start: 2022-10-27 | End: 2022-10-27

## 2022-10-25 RX ORDER — OXYCODONE HYDROCHLORIDE 5 MG/1
10 TABLET ORAL
Refills: 0 | Status: DISCONTINUED | OUTPATIENT
Start: 2022-10-25 | End: 2022-10-27

## 2022-10-25 RX ORDER — LEVETIRACETAM 250 MG/1
250 TABLET, FILM COATED ORAL
Refills: 0 | Status: DISCONTINUED | OUTPATIENT
Start: 2022-10-25 | End: 2022-10-27

## 2022-10-25 RX ORDER — APREPITANT 80 MG/1
40 CAPSULE ORAL ONCE
Refills: 0 | Status: COMPLETED | OUTPATIENT
Start: 2022-10-25 | End: 2022-10-25

## 2022-10-25 RX ORDER — HYDROXYCHLOROQUINE SULFATE 200 MG
200 TABLET ORAL DAILY
Refills: 0 | Status: DISCONTINUED | OUTPATIENT
Start: 2022-10-25 | End: 2022-10-27

## 2022-10-25 RX ORDER — FENTANYL CITRATE 50 UG/ML
25 INJECTION INTRAVENOUS
Refills: 0 | Status: DISCONTINUED | OUTPATIENT
Start: 2022-10-25 | End: 2022-10-25

## 2022-10-25 RX ADMIN — HYDROMORPHONE HYDROCHLORIDE 0.5 MILLIGRAM(S): 2 INJECTION INTRAMUSCULAR; INTRAVENOUS; SUBCUTANEOUS at 18:32

## 2022-10-25 RX ADMIN — ONDANSETRON 4 MILLIGRAM(S): 8 TABLET, FILM COATED ORAL at 18:56

## 2022-10-25 RX ADMIN — SODIUM CHLORIDE 150 MILLILITER(S): 9 INJECTION INTRAMUSCULAR; INTRAVENOUS; SUBCUTANEOUS at 22:59

## 2022-10-25 RX ADMIN — OXYCODONE HYDROCHLORIDE 5 MILLIGRAM(S): 5 TABLET ORAL at 23:40

## 2022-10-25 RX ADMIN — SODIUM CHLORIDE 500 MILLILITER(S): 9 INJECTION INTRAMUSCULAR; INTRAVENOUS; SUBCUTANEOUS at 18:53

## 2022-10-25 RX ADMIN — APREPITANT 40 MILLIGRAM(S): 80 CAPSULE ORAL at 12:43

## 2022-10-25 RX ADMIN — MORPHINE SULFATE 2 MILLIGRAM(S): 50 CAPSULE, EXTENDED RELEASE ORAL at 18:53

## 2022-10-25 RX ADMIN — SODIUM CHLORIDE 100 MILLILITER(S): 9 INJECTION INTRAMUSCULAR; INTRAVENOUS; SUBCUTANEOUS at 00:04

## 2022-10-25 RX ADMIN — MORPHINE SULFATE 2 MILLIGRAM(S): 50 CAPSULE, EXTENDED RELEASE ORAL at 19:07

## 2022-10-25 RX ADMIN — Medication 150 MICROGRAM(S): at 05:11

## 2022-10-25 RX ADMIN — PANTOPRAZOLE SODIUM 40 MILLIGRAM(S): 20 TABLET, DELAYED RELEASE ORAL at 05:11

## 2022-10-25 RX ADMIN — LEVETIRACETAM 250 MILLIGRAM(S): 250 TABLET, FILM COATED ORAL at 05:11

## 2022-10-25 RX ADMIN — OXYCODONE HYDROCHLORIDE 5 MILLIGRAM(S): 5 TABLET ORAL at 23:04

## 2022-10-25 RX ADMIN — SENNA PLUS 2 TABLET(S): 8.6 TABLET ORAL at 23:05

## 2022-10-25 RX ADMIN — CELECOXIB 400 MILLIGRAM(S): 200 CAPSULE ORAL at 12:42

## 2022-10-25 RX ADMIN — HYDROMORPHONE HYDROCHLORIDE 0.5 MILLIGRAM(S): 2 INJECTION INTRAMUSCULAR; INTRAVENOUS; SUBCUTANEOUS at 18:45

## 2022-10-25 RX ADMIN — Medication 15 MILLIGRAM(S): at 23:05

## 2022-10-25 RX ADMIN — CELECOXIB 200 MILLIGRAM(S): 200 CAPSULE ORAL at 05:11

## 2022-10-25 RX ADMIN — Medication 100 MILLIGRAM(S): at 22:59

## 2022-10-25 NOTE — PROGRESS NOTE ADULT - ASSESSMENT
73 y/o female with Hx of HTN, Hypothyroidism, Metastatic Melanoma, OA, she has right hip for the last 2 months, now worsening, she came in here for elective right Hip Total Joint replacement by dr. aSlazar on 10/20/22 s/p procedure.     Plan:     OA of the Right hip s/p THR post op day 05:     - post op no complications  - VS stable  - abx per ortho   - opiate induced constipation regimen   - encouraging incentive spirometry   -c/w local wound care per ortho   -DVT prophylaxis and Pain meds as per Ortho team   -PT/OT and weight bearing per ortho   -Encourage oral hydration.     Left hip OA: plan to have surgery on 10/25/22, NPO midnight    Hypothyroidism: levothyroxine 150 mcg once a day    GERD: Will continue with pantoprazole 40 mg once a day    HTN: On hydrochlorothiazide-losartan: once a day.     RA: will continue with hydroxychloroquine once a day.     Hx of Metastatic melanoma: s/p brain radiation, Seizure prophylaxis: On Keppra 250 mg 2 times a day    Would recommend Lovenox for DVT prophylaxis as she is high risk because of her hx of cancer.     Leucocytosis: Likely post op, no focus of infection, no fever, no chills.     Acute blood loss anemia due to procedure: Iron supplement.     Continue with current plan.      71 y/o female with Hx of HTN, Hypothyroidism, Metastatic Melanoma, OA, she has right hip for the last 2 months, now worsening, she came in here for elective right Hip Total Joint replacement by dr. Salazar on 10/20/22 s/p procedure.     Plan:     OA of the Right hip s/p THR post op day 05:     - post op no complications  - VS stable  - abx per ortho   - opiate induced constipation regimen   - encouraging incentive spirometry   -c/w local wound care per ortho   -DVT prophylaxis and Pain meds as per Ortho team   -PT/OT and weight bearing per ortho   -Encourage oral hydration.     Left hip OA: plan to have surgery on 10/25/22, NPO midnight    Hypothyroidism: levothyroxine 150 mcg once a day    GERD: Will continue with pantoprazole 40 mg once a day    HTN: On hydrochlorothiazide-losartan: once a day.     RA: will continue with hydroxychloroquine once a day.     Hx of Metastatic melanoma: s/p brain radiation, Seizure prophylaxis: On Keppra 250 mg 2 times a day    Would recommend Lovenox for DVT prophylaxis as she is high risk because of her hx of cancer.     Leucocytosis: Likely post op, no focus of infection, no fever, no chills.     Acute blood loss anemia due to procedure: Iron supplement.     Continue with current plan, going for the procedure today.

## 2022-10-25 NOTE — PHYSICAL THERAPY INITIAL EVALUATION ADULT - PHYSICAL ASSIST/NONPHYSICAL ASSIST: SUPINE/SIT, REHAB EVAL
required increased assistance to get bilateral LE's out of bed/assume upright sitting at EOB position + verbal cues for proper hand placement. verbal cues for reinforcement of posterior hip precautions/1 person assist
cues to maintain b/l hip precautions/verbal cues/nonverbal cues (demo/gestures)/1 person assist

## 2022-10-25 NOTE — PHYSICAL THERAPY INITIAL EVALUATION ADULT - PRECAUTIONS/LIMITATIONS, REHAB EVAL
posterior THPs/fall precautions/right hip precautions/surgical precautions
posterior hip precautions/bilateral hip precautions

## 2022-10-25 NOTE — PHYSICAL THERAPY INITIAL EVALUATION ADULT - PHYSICAL ASSIST/NONPHYSICAL ASSIST: SIT/SUPINE, REHAB EVAL
required increased assistance  to help get bilateral LE's into bed/assume proper semi-wright position + verbal cues for proper hand placement. verbal cues for reinforcement of posterior hip precautions/1 person assist
cues to maintain b/l hip precautions/verbal cues/nonverbal cues (demo/gestures)/1 person assist

## 2022-10-25 NOTE — PHYSICAL THERAPY INITIAL EVALUATION ADULT - BALANCE DISTURBANCE, IDENTIFIED IMPAIRMENT CONTRIBUTE, REHAB EVAL
Summary  4/3/18  Summary-  Chart reviewed in epic.  Noted communication from dr. Sellers about completion of form for alphonse for pt to start exercise program.  Call to pt and she states she will call me back in about 1 hour.  12:30 pt returned call       Interventions  Pneumonia  Resolved goals met    New care plan created    Hypertension- reviewed blood pressure log am pressures are 144/70 -160's over 80.  Pt states she has appointment with dr. Sellers next week and will review blood pressures with him as it is still a little high.  Advised thatt these numbers are still a little high and pt states she understands but does not want to make to many changes too quickly.  Pt states she know she is not doing as good with diet as she should.  Reviewed the daily low sodium recommendation of 2000 mg.  Reviewed usual diet  Pt states lately she has been having a [employer] biscuit with butter and jam with coffee.  Looked up sodium content and a  Biscuit alone is 740 mg, 1 pat of butter is 260 jam 0.  Together this is almost 1/2 of daily allowance.  Reviewed low sodium choices.  Pt states she received the educational information from me but is not home to review so we discussed and she states she will find the material and read tonight.  Advised that most all fast food is high in sodium  Pt verbalized understanding and we will continue to review at next encounter     plan  Hypertension Education on hypertension and treatment                          Educate on daily recommended sodium intake                        Review kaitlin information     pain/decreased ROM/decreased strength

## 2022-10-25 NOTE — BRIEF OPERATIVE NOTE - NSICDXBRIEFPOSTOP_GEN_ALL_CORE_FT
POST-OP DIAGNOSIS:  Arthritis of left hip 25-Oct-2022 17:30:43  Randy Salazar  
POST-OP DIAGNOSIS:  Arthropathy of right hip 20-Oct-2022 15:04:22  Randy Salazar

## 2022-10-25 NOTE — PHYSICAL THERAPY INITIAL EVALUATION ADULT - ADDITIONAL COMMENTS
lives with spouse, with no CALVIN + 1 flight to 2nd floor bed&bath however, pt has been residing on the ground floor, as pt has not been able to tolerate negotiating stairs x 4 weeks PTA. pt has been minimally ambulatory x 4 weeks PTA 2/2 increased pain and crepitus, spouse has assisted pt with all transfers/mobility/ADLs. pt has RW and commode DME at home.
lives with spouse, with no CALVIN + 1 flight to 2nd floor bed&bath however, pt has been residing on the ground floor, as pt has not been able to tolerate negotiating stairs x 4 weeks PTA. pt has been minimally ambulatory x 4 weeks PTA 2/2 increased pain and crepitus, spouse has assisted pt with all transfers/mobility/ADLs. pt has RW and commode DME at home.

## 2022-10-25 NOTE — PHYSICAL THERAPY INITIAL EVALUATION ADULT - RANGE OF MOTION EXAMINATION, REHAB EVAL
b/l Hip AROM assessed/limited to within precautions
bilateral upper extremity ROM was WNL (within normal limits)/bilateral lower extremity ROM was WFL (within functional limits)

## 2022-10-25 NOTE — PHYSICAL THERAPY INITIAL EVALUATION ADULT - PERTINENT HX OF CURRENT PROBLEM, REHAB EVAL
pt is here for an elective R THR 2/2 AVN. pt stated that she's currently undergoing cancer treatment for melanoma with mets to brain and 1 week after starting immunotherapy, pt began to experience difficulty with mobility, denies injury/trauma. upon f/u at [facility], pt was diagnosed with bilateral hip AVN x 5 weeks ago. pt is pending L THR on 10/25/22 with Dr. Salazar
s/p b/l hip replacements

## 2022-10-25 NOTE — PHYSICAL THERAPY INITIAL EVALUATION ADULT - DIAGNOSIS, PT EVAL
Decreased functional status and mobility due to decrease strength, transfers, balance, endurance and ambulation/stair tolerance
Impaired functional mobility

## 2022-10-25 NOTE — BRIEF OPERATIVE NOTE - NSICDXBRIEFPROCEDURE_GEN_ALL_CORE_FT
PROCEDURES:  Left total hip arthroplasty 25-Oct-2022 17:30:13  Randy Salazar  
PROCEDURES:  Right total hip replacement 20-Oct-2022 15:04:04  Randy Salazar

## 2022-10-25 NOTE — PHYSICAL THERAPY INITIAL EVALUATION ADULT - NSPTDISCHREC_GEN_A_CORE
Home PT & assist
acute rehab for strength, transfers, balance, endurance and ambulation/stair training pending progress. pt would benefit from increased hours of rehab a day/3 hrs a day to help progress to functional goals.

## 2022-10-25 NOTE — PROGRESS NOTE ADULT - SUBJECTIVE AND OBJECTIVE BOX
NEHA ALEJANDRA    [id number]    History: Patient is status post left posterior total hip arthroplasty POD # 0. Patient s/p Right total hip replacement POD #5. Patient is doing well and is comfortable. The patient's pain is controlled using the prescribed pain medications. The patient will be participating in physical therapy. Denies numbness or tingling. No new complaints.    Vital Signs Last 24 Hrs  T(C): 36.2 (25 Oct 2022 19:30), Max: 36.9 (25 Oct 2022 10:18)  T(F): 97.2 (25 Oct 2022 19:30), Max: 98.4 (25 Oct 2022 10:18)  HR: 70 (25 Oct 2022 19:30) (64 - 94)  BP: 129/63 (25 Oct 2022 19:30) (118/60 - 180/87)  BP(mean): 100 (25 Oct 2022 19:15) (74 - 100)  RR: 18 (25 Oct 2022 19:30) (16 - 18)  SpO2: 100% (25 Oct 2022 19:30) (92% - 100%)    Parameters below as of 25 Oct 2022 19:30  Patient On (Oxygen Delivery Method): room air                          9.5    10.26 )-----------( 402      ( 24 Oct 2022 08:52 )             29.6     10-24    134<L>  |  97<L>  |  10.8  ----------------------------<  119<H>  3.6   |  26.0  |  0.64    Ca    8.8      24 Oct 2022 08:52    General: Alert, awake, NAD, abduction pillow in place  Physical exam:   R/L LE: The left and right hip dressing is clean, dry and intact. No drainage, discharge, erythema, blistering or ecchymosis. The calf is supple nontender. SILT. +EHL/FHL/AT/GC. No foot drop. 2+ DP pulse. BCR. No cyanosis.      Plan:   - DVT prophylaxis as prescribed, including use of compression devices and ankle pumps  -  Continue physical therapy  - Weight bearing status of surgical extremity: WBAT  - Incentive spirometry encouraged  - Pain control as clinically indicated  - Posterior hip precautions reviewed with patient  - Discharge planning – anticipated discharge is Home / subacute rehabilitation / acute rehabilitation

## 2022-10-25 NOTE — PHYSICAL THERAPY INITIAL EVALUATION ADULT - PLANNED THERAPY INTERVENTIONS, PT EVAL
stair training/balance training/bed mobility training/gait training/strengthening/transfer training
bed mobility training/gait training/ROM/strengthening/transfer training

## 2022-10-25 NOTE — PHYSICAL THERAPY INITIAL EVALUATION ADULT - PHYSICAL ASSIST/NONPHYSICAL ASSIST: STAND/SIT, REHAB EVAL
required increased assistance  to help safely lower to a sitting position + verbal cues for proper hand placement./1 person assist
cues to maintain b/l hip precautions/verbal cues/1 person assist

## 2022-10-25 NOTE — PHYSICAL THERAPY INITIAL EVALUATION ADULT - MANUAL MUSCLE TESTING RESULTS, REHAB EVAL
bilateral UE's + bilateral LE's grossly assessed via functional assessment of sit to stand transfer, 3/5
b/l LE grossly >=3/5

## 2022-10-25 NOTE — PHYSICAL THERAPY INITIAL EVALUATION ADULT - GENERAL OBSERVATIONS, REHAB EVAL
Pt received in bed, + IV, +Tele//BP monitoring, + b/l VCBs, + hip abduction wedge, breathing on RA in NAD, in 6/10 pain in left hip, agreeable to PT evaluation

## 2022-10-25 NOTE — PHYSICAL THERAPY INITIAL EVALUATION ADULT - IMPAIRMENTS FOUND, PT EVAL
gait, locomotion, and balance/muscle strength/ROM
aerobic capacity/endurance/gait, locomotion, and balance/gross motor/muscle strength

## 2022-10-25 NOTE — BRIEF OPERATIVE NOTE - NSICDXBRIEFPREOP_GEN_ALL_CORE_FT
PRE-OP DIAGNOSIS:  Arthritis of left hip 25-Oct-2022 17:30:33  Randy Salazar  
PRE-OP DIAGNOSIS:  Arthropathy of right hip 20-Oct-2022 15:04:14  Randy Salazar

## 2022-10-25 NOTE — PROGRESS NOTE ADULT - SUBJECTIVE AND OBJECTIVE BOX
NEHA ALEJANDRA    [id number]    72y      Female    Patient is a 72y old  Female who presents with a chief complaint of RAVI (20 Oct 2022 18:39)      INTERVAL HPI/OVERNIGHT EVENTS:    has no complains, pain well controlled, no fever, chills, chest pain or sob, no overnight issues     REVIEW OF SYSTEMS:    CONSTITUTIONAL: No fever, some fatigue  RESPIRATORY: No cough, No shortness of breath  CARDIOVASCULAR: No chest pain, palpitations  GASTROINTESTINAL: No abdominal, No nausea, vomiting  NEUROLOGICAL: No headaches,  loss of strength.  MISCELLANEOUS: Hip pain is well controlled        Vital Signs Last 24 Hrs  T(C): 36.7 (25 Oct 2022 04:47), Max: 36.9 (24 Oct 2022 15:24)  T(F): 98 (25 Oct 2022 04:47), Max: 98.4 (24 Oct 2022 15:24)  HR: 72 (25 Oct 2022 04:47) (72 - 74)  BP: 137/78 (25 Oct 2022 04:47) (129/64 - 137/78)  RR: 18 (25 Oct 2022 04:47) (18 - 18)  SpO2: 96% (25 Oct 2022 04:47) (94% - 96%)    Parameters below as of 25 Oct 2022 04:47  Patient On (Oxygen Delivery Method): room air        PHYSICAL EXAM:    GENERAL: Elderly female looking comfortable  HEENT: PERRL, +EOMI  NECK: soft, Supple, No JVD  CHEST/LUNG: Clear to auscultate bilaterally; No wheezing  HEART: S1S2+, Regular rate and rhythm; No murmurs  ABDOMEN: Soft, Nontender, Nondistended; Bowel sounds present  EXTREMITIES:  1+ Peripheral Pulses, No edema, Right hip Joint area cover with dressing, no bleeding or soaking   SKIN: No rashes or lesions  NEURO: AAOX3  PSYCH: normal mood      LABS:                        9.5    10.26 )-----------( 402      ( 24 Oct 2022 08:52 )             29.6     10-24    134<L>  |  97<L>  |  10.8  ----------------------------<  119<H>  3.6   |  26.0  |  0.64    Ca    8.8      24 Oct 2022 08:52      PT/INR - ( 24 Oct 2022 08:52 )   PT: 13.7 sec;   INR: 1.18 ratio         PTT - ( 24 Oct 2022 08:52 )  PTT:30.0 sec        I&O's Summary    24 Oct 2022 07:01  -  25 Oct 2022 07:00  --------------------------------------------------------  IN: 0 mL / OUT: 400 mL / NET: -400 mL        MEDICATIONS  (STANDING):  acetaminophen     Tablet .. 975 milliGRAM(s) Oral every 8 hours  acetaminophen   IVPB .. 1000 milliGRAM(s) IV Intermittent once  celecoxib 200 milliGRAM(s) Oral every 12 hours  hydrochlorothiazide 25 milliGRAM(s) Oral daily  hydroxychloroquine 200 milliGRAM(s) Oral daily  influenza  Vaccine (HIGH DOSE) 0.7 milliLiter(s) IntraMuscular once  levETIRAcetam 250 milliGRAM(s) Oral two times a day  levothyroxine 150 MICROGram(s) Oral daily  losartan 100 milliGRAM(s) Oral daily  multivitamin 1 Tablet(s) Oral daily  pantoprazole    Tablet 40 milliGRAM(s) Oral before breakfast  polyethylene glycol 3350 17 Gram(s) Oral at bedtime  senna 2 Tablet(s) Oral at bedtime  sodium chloride 0.9%. 1000 milliLiter(s) (100 mL/Hr) IV Continuous <Continuous>  sodium chloride 0.9%. 1000 milliLiter(s) (100 mL/Hr) IV Continuous <Continuous>    MEDICATIONS  (PRN):  acetaminophen     Tablet .. 650 milliGRAM(s) Oral every 6 hours PRN Temp greater or equal to 38C (100.4F)  HYDROmorphone   Tablet 4 milliGRAM(s) Oral every 4 hours PRN Severe Pain (7 - 10)  magnesium hydroxide Suspension 30 milliLiter(s) Oral daily PRN Constipation  ondansetron Injectable 4 milliGRAM(s) IV Push every 6 hours PRN Nausea and/or Vomiting  oxyCODONE    IR 5 milliGRAM(s) Oral every 3 hours PRN Mild Pain (1 - 3)  oxyCODONE    IR 10 milliGRAM(s) Oral every 3 hours PRN Moderate Pain (4 - 6)

## 2022-10-26 LAB
ANION GAP SERPL CALC-SCNC: 10 MMOL/L — SIGNIFICANT CHANGE UP (ref 5–17)
BUN SERPL-MCNC: 12.9 MG/DL — SIGNIFICANT CHANGE UP (ref 8–20)
CALCIUM SERPL-MCNC: 8.3 MG/DL — LOW (ref 8.4–10.5)
CHLORIDE SERPL-SCNC: 101 MMOL/L — SIGNIFICANT CHANGE UP (ref 96–108)
CO2 SERPL-SCNC: 26 MMOL/L — SIGNIFICANT CHANGE UP (ref 22–29)
CREAT SERPL-MCNC: 0.69 MG/DL — SIGNIFICANT CHANGE UP (ref 0.5–1.3)
EGFR: 93 ML/MIN/1.73M2 — SIGNIFICANT CHANGE UP
GLUCOSE SERPL-MCNC: 99 MG/DL — SIGNIFICANT CHANGE UP (ref 70–99)
HCT VFR BLD CALC: 25.2 % — LOW (ref 34.5–45)
HGB BLD-MCNC: 8.3 G/DL — LOW (ref 11.5–15.5)
MCHC RBC-ENTMCNC: 30.4 PG — SIGNIFICANT CHANGE UP (ref 27–34)
MCHC RBC-ENTMCNC: 32.9 GM/DL — SIGNIFICANT CHANGE UP (ref 32–36)
MCV RBC AUTO: 92.3 FL — SIGNIFICANT CHANGE UP (ref 80–100)
PLATELET # BLD AUTO: 357 K/UL — SIGNIFICANT CHANGE UP (ref 150–400)
POTASSIUM SERPL-MCNC: 4 MMOL/L — SIGNIFICANT CHANGE UP (ref 3.5–5.3)
POTASSIUM SERPL-SCNC: 4 MMOL/L — SIGNIFICANT CHANGE UP (ref 3.5–5.3)
RBC # BLD: 2.73 M/UL — LOW (ref 3.8–5.2)
RBC # FLD: 12.6 % — SIGNIFICANT CHANGE UP (ref 10.3–14.5)
SODIUM SERPL-SCNC: 136 MMOL/L — SIGNIFICANT CHANGE UP (ref 135–145)
WBC # BLD: 12.09 K/UL — HIGH (ref 3.8–10.5)
WBC # FLD AUTO: 12.09 K/UL — HIGH (ref 3.8–10.5)

## 2022-10-26 PROCEDURE — 99232 SBSQ HOSP IP/OBS MODERATE 35: CPT

## 2022-10-26 RX ORDER — APIXABAN 2.5 MG/1
1 TABLET, FILM COATED ORAL
Qty: 70 | Refills: 0
Start: 2022-10-26 | End: 2022-11-29

## 2022-10-26 RX ORDER — ACETAMINOPHEN 500 MG
2 TABLET ORAL
Qty: 0 | Refills: 0 | DISCHARGE

## 2022-10-26 RX ORDER — OXYCODONE HYDROCHLORIDE 5 MG/1
1 TABLET ORAL
Qty: 28 | Refills: 0
Start: 2022-10-26 | End: 2022-11-01

## 2022-10-26 RX ORDER — SENNOSIDES/DOCUSATE SODIUM 8.6MG-50MG
2 TABLET ORAL
Qty: 28 | Refills: 0
Start: 2022-10-26 | End: 2022-11-08

## 2022-10-26 RX ORDER — OXYCODONE HYDROCHLORIDE 5 MG/1
1 TABLET ORAL
Qty: 0 | Refills: 0 | DISCHARGE

## 2022-10-26 RX ADMIN — APIXABAN 2.5 MILLIGRAM(S): 2.5 TABLET, FILM COATED ORAL at 06:22

## 2022-10-26 RX ADMIN — LEVETIRACETAM 250 MILLIGRAM(S): 250 TABLET, FILM COATED ORAL at 06:21

## 2022-10-26 RX ADMIN — Medication 200 MILLIGRAM(S): at 11:15

## 2022-10-26 RX ADMIN — OXYCODONE HYDROCHLORIDE 5 MILLIGRAM(S): 5 TABLET ORAL at 21:00

## 2022-10-26 RX ADMIN — Medication 15 MILLIGRAM(S): at 11:15

## 2022-10-26 RX ADMIN — Medication 100 MILLIGRAM(S): at 06:20

## 2022-10-26 RX ADMIN — SENNA PLUS 2 TABLET(S): 8.6 TABLET ORAL at 20:13

## 2022-10-26 RX ADMIN — LEVETIRACETAM 250 MILLIGRAM(S): 250 TABLET, FILM COATED ORAL at 17:25

## 2022-10-26 RX ADMIN — Medication 150 MICROGRAM(S): at 06:21

## 2022-10-26 RX ADMIN — OXYCODONE HYDROCHLORIDE 5 MILLIGRAM(S): 5 TABLET ORAL at 12:18

## 2022-10-26 RX ADMIN — Medication 15 MILLIGRAM(S): at 18:31

## 2022-10-26 RX ADMIN — Medication 15 MILLIGRAM(S): at 17:29

## 2022-10-26 RX ADMIN — SODIUM CHLORIDE 150 MILLILITER(S): 9 INJECTION INTRAMUSCULAR; INTRAVENOUS; SUBCUTANEOUS at 06:19

## 2022-10-26 RX ADMIN — OXYCODONE HYDROCHLORIDE 5 MILLIGRAM(S): 5 TABLET ORAL at 11:18

## 2022-10-26 RX ADMIN — APIXABAN 2.5 MILLIGRAM(S): 2.5 TABLET, FILM COATED ORAL at 17:25

## 2022-10-26 RX ADMIN — PANTOPRAZOLE SODIUM 40 MILLIGRAM(S): 20 TABLET, DELAYED RELEASE ORAL at 06:22

## 2022-10-26 RX ADMIN — OXYCODONE HYDROCHLORIDE 5 MILLIGRAM(S): 5 TABLET ORAL at 20:13

## 2022-10-26 NOTE — PROGRESS NOTE ADULT - ASSESSMENT
73 y/o female with Hx of HTN, Hypothyroidism, Metastatic Melanoma, OA, she has right hip for the last 2 months, now worsening, she came in here for elective right Hip Total Joint replacement by dr. Salazar on 10/20/22 s/p procedure.     Plan:     OA of the Right hip s/p THR post op day 06, Left hip OA s/p THR post op day 01:     - post op no complications  - VS stable  - abx per ortho   - opiate induced constipation regimen   - encouraging incentive spirometry   -c/w local wound care per ortho   -DVT prophylaxis and Pain meds as per Ortho team   -PT/OT and weight bearing per ortho   -Encourage oral hydration.         Hypothyroidism: levothyroxine 150 mcg once a day    GERD: Will continue with pantoprazole 40 mg once a day    HTN: On hydrochlorothiazide-losartan: once a day.     RA: will continue with hydroxychloroquine once a day.     Hx of Metastatic melanoma: s/p brain radiation, Seizure prophylaxis: On Keppra 250 mg 2 times a day    Would recommend Lovenox for DVT prophylaxis as she is high risk because of her hx of cancer.     Leucocytosis: Likely post op, no focus of infection, no fever, no chills.     Acute blood loss anemia due to procedure: Iron supplement.

## 2022-10-26 NOTE — PROGRESS NOTE ADULT - SUBJECTIVE AND OBJECTIVE BOX
NEHA ALEJANDRA    [id number]    72y      Female    Patient is a 72y old  Female who presents with a chief complaint of RAVI (20 Oct 2022 18:39)      INTERVAL HPI/OVERNIGHT EVENTS:    she is s/p e has no complains, pain well controlled, no fever, chills, chest pain or sob, no overnight issues.     REVIEW OF SYSTEMS:    CONSTITUTIONAL: No fever, some fatigue  RESPIRATORY: No cough, No shortness of breath  CARDIOVASCULAR: No chest pain, palpitations  GASTROINTESTINAL: No abdominal, No nausea, vomiting  NEUROLOGICAL: No headaches,  loss of strength.  MISCELLANEOUS: Hip pain is well controlled         Vital Signs Last 24 Hrs  T(C): 37.2 (26 Oct 2022 09:42), Max: 37.2 (26 Oct 2022 09:42)  T(F): 99 (26 Oct 2022 09:42), Max: 99 (26 Oct 2022 09:42)  HR: 91 (26 Oct 2022 09:42) (64 - 91)  BP: 107/64 (26 Oct 2022 09:42) (101/58 - 180/87)  BP(mean): 82 (25 Oct 2022 20:00) (74 - 100)  RR: 18 (26 Oct 2022 09:42) (16 - 18)  SpO2: 95% (26 Oct 2022 09:42) (93% - 100%)    Parameters below as of 26 Oct 2022 09:42  Patient On (Oxygen Delivery Method): room air        PHYSICAL EXAM:    GENERAL: Elderly female looking comfortable  HEENT: PERRL, +EOMI  NECK: soft, Supple, No JVD  CHEST/LUNG: Clear to auscultate bilaterally; No wheezing  HEART: S1S2+, Regular rate and rhythm; No murmurs  ABDOMEN: Soft, Nontender, Nondistended; Bowel sounds present  EXTREMITIES:  1+ Peripheral Pulses, No edema, Right hip Joint area cover with dressing, no bleeding or soaking   SKIN: No rashes or lesions  NEURO: AAOX3  PSYCH: normal mood      LABS:                        8.3    12.09 )-----------( 357      ( 26 Oct 2022 06:40 )             25.2     10-26    136  |  101  |  12.9  ----------------------------<  99  4.0   |  26.0  |  0.69    Ca    8.3<L>      26 Oct 2022 06:40              I&O's Summary    25 Oct 2022 07:01  -  26 Oct 2022 07:00  --------------------------------------------------------  IN: 2440 mL / OUT: 650 mL / NET: 1790 mL    26 Oct 2022 07:01  -  26 Oct 2022 11:10  --------------------------------------------------------  IN: 0 mL / OUT: 200 mL / NET: -200 mL        MEDICATIONS  (STANDING):  acetaminophen     Tablet .. 975 milliGRAM(s) Oral every 8 hours  apixaban 2.5 milliGRAM(s) Oral two times a day  hydroxychloroquine 200 milliGRAM(s) Oral daily  influenza  Vaccine (HIGH DOSE) 0.7 milliLiter(s) IntraMuscular once  ketorolac   Injectable 15 milliGRAM(s) IV Push every 6 hours  levETIRAcetam 250 milliGRAM(s) Oral two times a day  levothyroxine 150 MICROGram(s) Oral daily  pantoprazole    Tablet 40 milliGRAM(s) Oral before breakfast  senna 2 Tablet(s) Oral at bedtime  sodium chloride 0.9%. 1000 milliLiter(s) (150 mL/Hr) IV Continuous <Continuous>    MEDICATIONS  (PRN):  HYDROmorphone   Tablet 4 milliGRAM(s) Oral every 3 hours PRN Severe Pain (7 - 10)  magnesium hydroxide Suspension 30 milliLiter(s) Oral daily PRN Constipation  oxyCODONE    IR 5 milliGRAM(s) Oral every 3 hours PRN Mild Pain (1 - 3)  oxyCODONE    IR 10 milliGRAM(s) Oral every 3 hours PRN Moderate Pain (4 - 6)

## 2022-10-27 ENCOUNTER — TRANSCRIPTION ENCOUNTER (OUTPATIENT)
Age: 72
End: 2022-10-27

## 2022-10-27 VITALS
TEMPERATURE: 98 F | HEART RATE: 94 BPM | DIASTOLIC BLOOD PRESSURE: 69 MMHG | SYSTOLIC BLOOD PRESSURE: 124 MMHG | RESPIRATION RATE: 18 BRPM | OXYGEN SATURATION: 94 %

## 2022-10-27 PROCEDURE — 99232 SBSQ HOSP IP/OBS MODERATE 35: CPT

## 2022-10-27 RX ORDER — CELECOXIB 200 MG/1
1 CAPSULE ORAL
Qty: 42 | Refills: 0
Start: 2022-10-27 | End: 2022-11-16

## 2022-10-27 RX ADMIN — PANTOPRAZOLE SODIUM 40 MILLIGRAM(S): 20 TABLET, DELAYED RELEASE ORAL at 05:36

## 2022-10-27 RX ADMIN — LEVETIRACETAM 250 MILLIGRAM(S): 250 TABLET, FILM COATED ORAL at 05:31

## 2022-10-27 RX ADMIN — Medication 150 MICROGRAM(S): at 05:31

## 2022-10-27 RX ADMIN — APIXABAN 2.5 MILLIGRAM(S): 2.5 TABLET, FILM COATED ORAL at 05:30

## 2022-10-27 RX ADMIN — OXYCODONE HYDROCHLORIDE 5 MILLIGRAM(S): 5 TABLET ORAL at 07:58

## 2022-10-27 RX ADMIN — Medication 200 MILLIGRAM(S): at 11:26

## 2022-10-27 RX ADMIN — OXYCODONE HYDROCHLORIDE 5 MILLIGRAM(S): 5 TABLET ORAL at 08:55

## 2022-10-27 RX ADMIN — CELECOXIB 200 MILLIGRAM(S): 200 CAPSULE ORAL at 05:30

## 2022-10-27 NOTE — DIETITIAN INITIAL EVALUATION ADULT - PERTINENT MEDS FT
MEDICATIONS  (STANDING):  apixaban 2.5 milliGRAM(s) Oral two times a day  celecoxib 200 milliGRAM(s) Oral every 12 hours  hydrochlorothiazide 25 milliGRAM(s) Oral daily  hydroxychloroquine 200 milliGRAM(s) Oral daily  influenza  Vaccine (HIGH DOSE) 0.7 milliLiter(s) IntraMuscular once  levETIRAcetam 250 milliGRAM(s) Oral two times a day  levothyroxine 150 MICROGram(s) Oral daily  losartan 100 milliGRAM(s) Oral daily  pantoprazole    Tablet 40 milliGRAM(s) Oral before breakfast  senna 2 Tablet(s) Oral at bedtime  sodium chloride 0.9%. 1000 milliLiter(s) (150 mL/Hr) IV Continuous <Continuous>    MEDICATIONS  (PRN):  bisacodyl Suppository 10 milliGRAM(s) Rectal once PRN Constipation  HYDROmorphone   Tablet 4 milliGRAM(s) Oral every 3 hours PRN Severe Pain (7 - 10)  magnesium hydroxide Suspension 30 milliLiter(s) Oral daily PRN Constipation  oxyCODONE    IR 5 milliGRAM(s) Oral every 3 hours PRN Mild Pain (1 - 3)  oxyCODONE    IR 10 milliGRAM(s) Oral every 3 hours PRN Moderate Pain (4 - 6)

## 2022-10-27 NOTE — PROGRESS NOTE ADULT - PROVIDER SPECIALTY LIST ADULT
Hospitalist
Orthopedics
Hospitalist

## 2022-10-27 NOTE — DIETITIAN INITIAL EVALUATION ADULT - ORAL INTAKE PTA/DIET HISTORY
Interviewed pt at bedside. Pt reports have good appetite and intake PTA and in house, consume % of meals. Reports eating 3 regular meals a day, does not eat any protein at breakfast just oatmeal and fruit. UBW 250lbs with no recent changes in weight. Provided education on HBV proteins at each meal including breakfast to promote healing, pt receptive.

## 2022-10-27 NOTE — DISCHARGE NOTE NURSING/CASE MANAGEMENT/SOCIAL WORK - NSDCPEFALRISK_GEN_ALL_CORE
For information on Fall & Injury Prevention, visit: [email or web address] OR  [email or web address] OR  https://www.cdc.gov/steadi/patient.html

## 2022-10-27 NOTE — DIETITIAN INITIAL EVALUATION ADULT - OTHER INFO
73 y/o female with Hx of HTN, Hypothyroidism, Metastatic Melanoma, OA, she has right hip for the last 2 months, now worsening, she came in here for elective right Hip Total Joint replacement by dr. Salazar on 10/20/22 s/p procedure. Aware plan PT/OT.

## 2022-10-27 NOTE — PROGRESS NOTE ADULT - SUBJECTIVE AND OBJECTIVE BOX
NEHA ROBERTS    [id number]    72y      Female    Patient is a 72y old  Female who presents with a chief complaint of Primary osteoarthritis of right hip     (27 Oct 2022 09:28)      INTERVAL HPI/OVERNIGHT EVENTS:    She is s/p has no complains, pain well controlled, working well with PT, no fever, chills, chest pain or sob, no overnight issues.     REVIEW OF SYSTEMS:    CONSTITUTIONAL: No fever, some fatigue  RESPIRATORY: No cough, No shortness of breath  CARDIOVASCULAR: No chest pain, palpitations  GASTROINTESTINAL: No abdominal, No nausea, vomiting  NEUROLOGICAL: No headaches,  loss of strength.  MISCELLANEOUS: Hip pain is well controlled        Vital Signs Last 24 Hrs  T(C): 37.1 (27 Oct 2022 08:45), Max: 37.1 (27 Oct 2022 08:45)  T(F): 98.7 (27 Oct 2022 08:45), Max: 98.7 (27 Oct 2022 08:45)  HR: 81 (27 Oct 2022 09:44) (70 - 89)  BP: 91/54 (27 Oct 2022 09:44) (91/54 - 135/68)  RR: 18 (27 Oct 2022 08:45) (18 - 18)  SpO2: 93% (27 Oct 2022 08:45) (93% - 96%)    Parameters below as of 27 Oct 2022 08:45  Patient On (Oxygen Delivery Method): room air      PHYSICAL EXAM:    GENERAL: Elderly female looking comfortable  HEENT: PERRL, +EOMI  NECK: soft, Supple, No JVD  CHEST/LUNG: Clear to auscultate bilaterally; No wheezing  HEART: S1S2+, Regular rate and rhythm; No murmurs  ABDOMEN: Soft, Nontender, Nondistended; Bowel sounds present  EXTREMITIES:  1+ Peripheral Pulses, No edema, Right hip Joint area cover with dressing, no bleeding or soaking   SKIN: No rashes or lesions  NEURO: AAOX3  PSYCH: normal mood      LABS:                        8.3    12.09 )-----------( 357      ( 26 Oct 2022 06:40 )             25.2     10-26    136  |  101  |  12.9  ----------------------------<  99  4.0   |  26.0  |  0.69    Ca    8.3<L>      26 Oct 2022 06:40              I&O's Summary    26 Oct 2022 07:01  -  27 Oct 2022 07:00  --------------------------------------------------------  IN: 0 mL / OUT: 200 mL / NET: -200 mL        MEDICATIONS  (STANDING):  apixaban 2.5 milliGRAM(s) Oral two times a day  celecoxib 200 milliGRAM(s) Oral every 12 hours  hydrochlorothiazide 25 milliGRAM(s) Oral daily  hydroxychloroquine 200 milliGRAM(s) Oral daily  influenza  Vaccine (HIGH DOSE) 0.7 milliLiter(s) IntraMuscular once  levETIRAcetam 250 milliGRAM(s) Oral two times a day  levothyroxine 150 MICROGram(s) Oral daily  losartan 100 milliGRAM(s) Oral daily  pantoprazole    Tablet 40 milliGRAM(s) Oral before breakfast  senna 2 Tablet(s) Oral at bedtime  sodium chloride 0.9%. 1000 milliLiter(s) (150 mL/Hr) IV Continuous <Continuous>    MEDICATIONS  (PRN):  bisacodyl Suppository 10 milliGRAM(s) Rectal once PRN Constipation  HYDROmorphone   Tablet 4 milliGRAM(s) Oral every 3 hours PRN Severe Pain (7 - 10)  magnesium hydroxide Suspension 30 milliLiter(s) Oral daily PRN Constipation  oxyCODONE    IR 5 milliGRAM(s) Oral every 3 hours PRN Mild Pain (1 - 3)  oxyCODONE    IR 10 milliGRAM(s) Oral every 3 hours PRN Moderate Pain (4 - 6)

## 2022-10-27 NOTE — PROGRESS NOTE ADULT - ASSESSMENT
71 y/o female with Hx of HTN, Hypothyroidism, Metastatic Melanoma, OA, she has right hip for the last 2 months, now worsening, she came in here for elective right Hip Total Joint replacement by dr. Salazar on 10/20/22 s/p procedure.     Plan:     OA of the Right hip s/p THR post op day 07, Left hip OA s/p THR post op day 02:     - post op no complications  - VS stable  - abx per ortho   - opiate induced constipation regimen   - encouraging incentive spirometry   -c/w local wound care per ortho   -DVT prophylaxis and Pain meds as per Ortho team   -PT/OT and weight bearing per ortho   -Encourage oral hydration.       Hypothyroidism: levothyroxine 150 mcg once a day    GERD: Will continue with pantoprazole 40 mg once a day    HTN: On hydrochlorothiazide-losartan: once a day.     RA: will continue with hydroxychloroquine once a day.     Hx of Metastatic melanoma: s/p brain radiation, Seizure prophylaxis: On Keppra 250 mg 2 times a day    Would recommend Lovenox for DVT prophylaxis as she is high risk because of her hx of cancer.     Leucocytosis: Likely post op, no focus of infection, no fever, no chills.     Acute blood loss anemia due to procedure: Iron supplement, CBC check with PMD in 2 weeks    Patient is stable from the medicine point of view for discharge pending PT and Ortho eval.

## 2022-10-27 NOTE — PROGRESS NOTE ADULT - SUBJECTIVE AND OBJECTIVE BOX
NEHA ALEJANDRA    [id number]    History: Patient is status post left total hip arthroplasty POD#2 and right posterior total hip arthroplasty, POD#7. Patient is doing well. The patient's pain is controlled using the prescribed pain medications. The patient is participating in physical therapy. Denies nausea, vomiting, chest pain, shortness of breath, abdominal pain or fever. No new complaints.                            8.3    12.09 )-----------( 357      ( 26 Oct 2022 06:40 )             25.2       10-26    136  |  101  |  12.9  ----------------------------<  99  4.0   |  26.0  |  0.69    Ca    8.3<L>      26 Oct 2022 06:40          Physical exam: The left and right hip dressing is clean, dry and intact. No drainage or discharge. No erythema is noted. No blistering. No ecchymosis. The calf is supple nontender. Sensation to light touch is grossly intact distally. +dorsi/plantarflexion/EHL/FHL. No foot drop. 2+ dorsalis pedis pulse. Capillary refill is less than 2 seconds. No cyanosis.    Primary Orthopedic Assessment:  • s/p left total hip arthroplasty POD#2 and right posterior total hip arthroplasty, POD#7        Plan:   • DVT prophylaxis as prescribed, including use of compression devices and ankle pumps  • Continue physical therapy  • Weightbearing as tolerated of the left lower extremity with assistance of a walker  • Incentive spirometry encouraged  • Pain control as clinically indicated  • Posterior hip precautions reviewed with patient  • Discharge planning

## 2022-10-27 NOTE — DISCHARGE NOTE NURSING/CASE MANAGEMENT/SOCIAL WORK - PATIENT PORTAL LINK FT
You can access the FollowMyHealth Patient Portal offered by [facility] by registering at the following website: [email or web address]. By joining "[employer]"’s FollowMyHealth portal, you will also be able to view your health information using other applications (apps) compatible with our system.

## 2022-10-27 NOTE — DIETITIAN INITIAL EVALUATION ADULT - PERTINENT LABORATORY DATA
10-26    136  |  101  |  12.9  ----------------------------<  99  4.0   |  26.0  |  0.69    Ca    8.3<L>      26 Oct 2022 06:40    A1C with Estimated Average Glucose Result: 5.4 % (10-04-22 @ 17:00)

## 2022-10-27 NOTE — DIETITIAN INITIAL EVALUATION ADULT - NSICDXPASTMEDICALHX_GEN_ALL_CORE_FT
PAST MEDICAL HISTORY:  DVT, lower extremity left 2021, has a valery filter    Hypertension     Hypothyroidism     Melanoma diagnosed in 2016, had an excision, 2021 diagnesed metastatic, brain lesion and different spots in her body, had radiation and immunotherapy and steroids, not on any Rx now. treated at [facility]

## 2022-10-27 NOTE — DISCHARGE NOTE NURSING/CASE MANAGEMENT/SOCIAL WORK - FLU SEASON?
Subjective:  HPI                    Objective:  System    Physical Exam    General     ROS    Assessment/Plan:    DISCHARGE REPORT    Progress reporting period is from 7/11/18 to 8/17/18.      SUBJECTIVE  Subjective changes noted by patient:    General awareness of sx throughout the day, does increase upon waking in the AM or with lifting items. Has been using L UE more often.     Current Pain level: 2/10 (up to 5/10 )    Previous pain level was:  4/10     Changes in function:  Yes (See Goal flowsheet attached for changes in current functional level) Changes in function: Yes, see goal flow sheet for change in function   Adverse reaction to treatment or activity: None     OBJECTIVE  Changes noted in objective findings:  The objective findings below are from DOS 8/17/18.  Increased thoracic mobility present, pt able to maintain posture throughout session without prompting.   R shld AROM: flex=WFL with painful arc, abd=WFL, reaching across body with tightness, reach to T8 behind back with tightness.             Yes...

## 2022-11-09 PROCEDURE — 85610 PROTHROMBIN TIME: CPT

## 2022-11-09 PROCEDURE — 86900 BLOOD TYPING SEROLOGIC ABO: CPT

## 2022-11-09 PROCEDURE — 82962 GLUCOSE BLOOD TEST: CPT

## 2022-11-09 PROCEDURE — 36415 COLL VENOUS BLD VENIPUNCTURE: CPT

## 2022-11-09 PROCEDURE — 80048 BASIC METABOLIC PNL TOTAL CA: CPT

## 2022-11-09 PROCEDURE — C1776: CPT

## 2022-11-09 PROCEDURE — 97110 THERAPEUTIC EXERCISES: CPT

## 2022-11-09 PROCEDURE — 86901 BLOOD TYPING SEROLOGIC RH(D): CPT

## 2022-11-09 PROCEDURE — 86850 RBC ANTIBODY SCREEN: CPT

## 2022-11-09 PROCEDURE — 85730 THROMBOPLASTIN TIME PARTIAL: CPT

## 2022-11-09 PROCEDURE — 73502 X-RAY EXAM HIP UNI 2-3 VIEWS: CPT

## 2022-11-09 PROCEDURE — C1713: CPT

## 2022-11-09 PROCEDURE — 97116 GAIT TRAINING THERAPY: CPT

## 2022-11-09 PROCEDURE — U0003: CPT

## 2022-11-09 PROCEDURE — U0005: CPT

## 2022-11-09 PROCEDURE — 85027 COMPLETE CBC AUTOMATED: CPT

## 2022-11-11 ENCOUNTER — APPOINTMENT (OUTPATIENT)
Dept: ORTHOPEDIC SURGERY | Facility: CLINIC | Age: 72
End: 2022-11-11

## 2022-11-11 VITALS
DIASTOLIC BLOOD PRESSURE: 76 MMHG | BODY MASS INDEX: 40.18 KG/M2 | WEIGHT: 250 LBS | HEART RATE: 98 BPM | HEIGHT: 66 IN | SYSTOLIC BLOOD PRESSURE: 118 MMHG

## 2022-11-11 PROCEDURE — 99024 POSTOP FOLLOW-UP VISIT: CPT

## 2022-11-11 PROCEDURE — 73523 X-RAY EXAM HIPS BI 5/> VIEWS: CPT

## 2022-11-11 RX ORDER — LEVETIRACETAM 500 MG/1
500 TABLET, FILM COATED, EXTENDED RELEASE ORAL
Qty: 180 | Refills: 0 | Status: ACTIVE | COMMUNITY
Start: 2022-09-26

## 2022-11-11 RX ORDER — LEVOTHYROXINE SODIUM 0.11 MG/1
112 TABLET ORAL
Qty: 30 | Refills: 0 | Status: ACTIVE | COMMUNITY
Start: 2022-05-17

## 2022-11-11 RX ORDER — APIXABAN 2.5 MG/1
2.5 TABLET, FILM COATED ORAL
Qty: 70 | Refills: 0 | Status: ACTIVE | COMMUNITY
Start: 2022-10-26

## 2022-11-11 RX ORDER — CELECOXIB 200 MG/1
200 CAPSULE ORAL
Qty: 42 | Refills: 0 | Status: ACTIVE | COMMUNITY
Start: 2022-10-26

## 2022-11-11 RX ORDER — LEVOTHYROXINE SODIUM 0.09 MG/1
88 TABLET ORAL
Qty: 90 | Refills: 0 | Status: ACTIVE | COMMUNITY
Start: 2022-06-10

## 2022-11-11 RX ORDER — HYDROXYCHLOROQUINE SULFATE 200 MG/1
200 TABLET, FILM COATED ORAL
Qty: 60 | Refills: 0 | Status: ACTIVE | COMMUNITY
Start: 2022-11-07

## 2022-11-11 RX ORDER — PREDNISONE 20 MG/1
20 TABLET ORAL
Qty: 28 | Refills: 0 | Status: ACTIVE | COMMUNITY
Start: 2022-06-27

## 2022-11-11 RX ORDER — PREDNISONE 10 MG/1
10 TABLET ORAL
Qty: 14 | Refills: 0 | Status: ACTIVE | COMMUNITY
Start: 2022-08-26

## 2022-11-11 NOTE — HISTORY OF PRESENT ILLNESS
[2] : the patient reports pain that is 2/10 in severity [Clean/Dry/Intact] : clean, dry and intact [Healed] : healed [Swelling] : swollen [Neuro Intact] : an unremarkable neurological exam [Vascular Intact] : ~T peripheral vascular exam normal [Negative Lizzie's] : maneuvers demonstrated a negative Lizzie's sign [Xray (Date:___)] : [unfilled] Xray -  [Doing Well] : is doing well [No Sign of Infection] : is showing no signs of infection [Adequate Pain Control] : has adequate pain control [Chills] : no chills [Constipation] : no constipation [Diarrhea] : no diarrhea [Dysuria] : no dysuria [Fever] : no fever [Nausea] : no nausea [Vomiting] : no vomiting [Erythema] : not erythematous [Discharge] : absent of discharge [Dehiscence] : not dehisced [de-identified] : s/p Left total hip arthroplasty DOS:10/25/22\par \par  [de-identified] : 71 y/o F pt presents 3 weeks from bilateral RAVI spaced 3 days apart. She is doing better and is no longer on a wheel chair. She has some soreness. But she is able to walk now. She uses a walker. She is happy with the surgical oucome.  [de-identified] : Bilateral hip exam shows healing incision with no sign of infection.  [de-identified] : 3V xray of the bilateral hip done in office today and reviewed by Dr. Randy Salazar demonstrates s/p bilateral hip implants in good positioning with no evidence of wear, loosening, or subsidence.   [de-identified] : We sent a script for home PT. She should continue to do low impact exercises. Pt understands the importance of prophylaxis for invasive dental procedures. Patient will continue to adhere to the posterior hip precautions. Pt should continue taking Eliquis for DVTP.	 F/u with us in 3 weeks.

## 2022-11-23 RX ORDER — OXYCODONE 5 MG/1
5 TABLET ORAL
Qty: 20 | Refills: 0 | Status: ACTIVE | COMMUNITY
Start: 2022-10-26 | End: 1900-01-01

## 2022-12-05 ENCOUNTER — APPOINTMENT (OUTPATIENT)
Dept: ORTHOPEDIC SURGERY | Facility: CLINIC | Age: 72
End: 2022-12-05

## 2023-01-11 ENCOUNTER — APPOINTMENT (OUTPATIENT)
Dept: ORTHOPEDIC SURGERY | Facility: CLINIC | Age: 73
End: 2023-01-11
Payer: MEDICARE

## 2023-01-11 VITALS
HEART RATE: 71 BPM | BODY MASS INDEX: 39.24 KG/M2 | DIASTOLIC BLOOD PRESSURE: 80 MMHG | WEIGHT: 250 LBS | HEIGHT: 67 IN | SYSTOLIC BLOOD PRESSURE: 128 MMHG

## 2023-01-11 PROCEDURE — 99024 POSTOP FOLLOW-UP VISIT: CPT

## 2023-01-11 PROCEDURE — 73522 X-RAY EXAM HIPS BI 3-4 VIEWS: CPT

## 2023-01-11 NOTE — HISTORY OF PRESENT ILLNESS
[Clean/Dry/Intact] : clean, dry and intact [Healed] : healed [Swelling] : swollen [Neuro Intact] : an unremarkable neurological exam [Vascular Intact] : ~T peripheral vascular exam normal [Negative Lizzie's] : maneuvers demonstrated a negative Lizzie's sign [Xray (Date:___)] : [unfilled] Xray -  [0] : no pain reported [Doing Well] : is doing well [No Sign of Infection] : is showing no signs of infection [Adequate Pain Control] : has adequate pain control [Chills] : no chills [Constipation] : no constipation [Diarrhea] : no diarrhea [Dysuria] : no dysuria [Fever] : no fever [Nausea] : no nausea [Vomiting] : no vomiting [Erythema] : not erythematous [Discharge] : absent of discharge [Dehiscence] : not dehisced [de-identified] : s/p Left total hip arthroplasty DOS:10/25/22. S/P: Right hybrid total hip arthroplasty. DOS: 10/20/22 [de-identified] : 73 y/o F pt is 12 weeks from b/l RAVI 5 days apart\par  she could not continue PT at home\par she is walking with cane.\par she had not too much pain other then right sided sicatic like pain. she would like to start out pt PT to build up strength   [de-identified] : b/l hip exam shows healed incision with no sign of infection.  [de-identified] : 5V xray of the b/l  hip done in office today and reviewed by Dr. Randy Salazar demonstrates s/p left hip implants in good positioning with no evidence of wear, loosening, or subsidence.  [de-identified] : We sent a script for outpatient PT. She should continue to do low impact exercises. Pt understands the importance of prophylaxis for invasive dental procedures. She will f/u in 3 months for reevaluation.

## 2023-02-03 PROBLEM — M25.551 BILATERAL HIP PAIN: Status: ACTIVE | Noted: 2022-09-20

## 2023-02-03 PROBLEM — M87.051 AVASCULAR NECROSIS OF BONE OF HIP, RIGHT: Status: ACTIVE | Noted: 2022-09-20

## 2023-02-03 PROBLEM — M87.052 AVASCULAR NECROSIS OF BONE OF HIP, LEFT: Status: ACTIVE | Noted: 2022-09-20

## 2023-04-05 ENCOUNTER — APPOINTMENT (OUTPATIENT)
Dept: ORTHOPEDIC SURGERY | Facility: CLINIC | Age: 73
End: 2023-04-05
Payer: MEDICARE

## 2023-04-05 VITALS
SYSTOLIC BLOOD PRESSURE: 125 MMHG | HEIGHT: 67 IN | WEIGHT: 250 LBS | HEART RATE: 76 BPM | DIASTOLIC BLOOD PRESSURE: 77 MMHG | BODY MASS INDEX: 39.24 KG/M2

## 2023-04-05 DIAGNOSIS — Z96.642 PRESENCE OF LEFT ARTIFICIAL HIP JOINT: ICD-10-CM

## 2023-04-05 DIAGNOSIS — Z96.641 PRESENCE OF RIGHT ARTIFICIAL HIP JOINT: ICD-10-CM

## 2023-04-05 PROCEDURE — 99214 OFFICE O/P EST MOD 30 MIN: CPT

## 2023-04-05 NOTE — DISCUSSION/SUMMARY
[de-identified] : 73 y/o F pt is s/p bilateral RAVI. The nature of her condition and treatment options were discussed. The pt states her pain is improving and she is doing well. She is happy with the surgical outcome. She should continue to do low impact exercises. Pt understands the importance of prophylaxis for invasive dental procedures. She will f/u in a year for radiographic surveillance. All questions were answered.

## 2023-04-05 NOTE — PHYSICAL EXAM
[LE] : Sensory: Intact in bilateral lower extremities [ALL] : dorsalis pedis, posterior tibial, femoral, popliteal, and radial 2+ and symmetric bilaterally [de-identified] : GENERAL APPEARANCE: Well nourished and hydrated, pleasant, alert, and oriented x 3. Appears their stated age. \par HEENT: Normocephalic, extraocular eye motion intact. Nasal septum midline. Oral cavity clear. External auditory canal clear. \par RESPIRATORY: Breath sounds clear and audible in all lobes. No wheezing, No accessory muscle use.\par CARDIOVASCULAR: No apparent abnormalities. No lower leg edema. No varicosities. Pedal pulses are palpable.\par NEUROLOGIC: Sensation is normal, no muscle weakness in the upper or lower extremities.\par DERMATOLOGIC: No apparent skin lesions, moist, warm, no rash.\par SPINE: Cervical spine appears normal and moves freely; thoracic spine appears normal and moves freely; lumbosacral spine appears normal and moves freely, normal, nontender.\par MUSCULOSKELETAL: Hands, wrists, and elbows are normal and move freely, shoulders are normal and move freely.  [de-identified] : Bilateral hip exam shows healed incision with no sign of infection.  [de-identified] : 5V xray of the bilateral hips done in office today and reviewed by Dr. Randy Salazar demonstrates s/p bilateral hips implants in good positioning with no evidence of wear, loosening, or subsidence.

## 2023-04-05 NOTE — REASON FOR VISIT
[Follow-Up Visit] : a follow-up visit for [Other: ____] : [unfilled] [FreeTextEntry2] : s/p Left total hip arthroplasty DOS:10/25/22. S/P: Right hybrid total hip arthroplasty. DOS: 10/20/22. \par

## 2023-04-05 NOTE — REVIEW OF SYSTEMS
Please tell patient that this may be the earliest available option and to go ahead and schedule the testing.  Please also tell patient that we can schedule an earlier appt to discuss potential medication management in the interim while awaiting testing appt if she is agreeable. If not, then we can keep scheduled follow-up appt.   [Joint Pain] : joint pain [Negative] : Heme/Lymph [FreeTextEntry9] : bilateral hips

## 2023-06-29 NOTE — PHYSICAL THERAPY INITIAL EVALUATION ADULT - PHYSICAL ASSIST/NONPHYSICAL ASSIST: SIT/STAND, REHAB EVAL
Patient confirmed pharmacy.   
required increased assistance  to help rise to a full standing position +verbal cues for proper hand placement. pt was only able to tolerate approx 75% of full upright standing posture for approx 1 min/1 person assist
cues to maintain b/l hip precautions/verbal cues/1 person assist

## 2023-10-04 ENCOUNTER — APPOINTMENT (OUTPATIENT)
Dept: ORTHOPEDIC SURGERY | Facility: CLINIC | Age: 73
End: 2023-10-04
Payer: MEDICARE

## 2023-10-04 VITALS
HEIGHT: 67 IN | SYSTOLIC BLOOD PRESSURE: 135 MMHG | DIASTOLIC BLOOD PRESSURE: 85 MMHG | HEART RATE: 73 BPM | BODY MASS INDEX: 39.24 KG/M2 | WEIGHT: 250 LBS

## 2023-10-04 DIAGNOSIS — Z96.643 AFTERCARE FOLLOWING JOINT REPLACEMENT SURGERY: ICD-10-CM

## 2023-10-04 DIAGNOSIS — Z47.1 AFTERCARE FOLLOWING JOINT REPLACEMENT SURGERY: ICD-10-CM

## 2023-10-04 DIAGNOSIS — Z96.641 AFTERCARE FOLLOWING JOINT REPLACEMENT SURGERY: ICD-10-CM

## 2023-10-04 DIAGNOSIS — Z96.642 AFTERCARE FOLLOWING JOINT REPLACEMENT SURGERY: ICD-10-CM

## 2023-10-04 PROCEDURE — 73523 X-RAY EXAM HIPS BI 5/> VIEWS: CPT

## 2023-10-04 PROCEDURE — 99214 OFFICE O/P EST MOD 30 MIN: CPT

## 2024-04-30 NOTE — H&P PST ADULT - ALLERGIC/IMMUNOLOGIC
THIS PATIENT IS CURRENTLY GOING THROUGH DEVICE CLEARANCE FOR AN MRI AND WILL NEED TO HAVE A 2 VIEW CHEST XRAY DONE.  CAN YOUR OFFICE PLEASE PUT AN ORDER IN THE SYSTEM?    negative

## 2024-06-07 NOTE — HISTORY OF PRESENT ILLNESS
Addended by: MAYELA JUAN on: 6/7/2024 10:16 AM     Modules accepted: Orders     [Improving] : improving [1] : a current pain level of 1/10 [0] : a minimum pain level of 0/10 [3] : a maximum pain level of 3/10 [de-identified] : 71 y/o F pt is here for bilateral hips. She is s/p Left total hip arthroplasty on DOS 10/25/22 and S/P right hybrid total hip arthroplasty on DOS: 10/20/22. The pt states she is odoing well and her function is improving. She is overall happy with the surgical outcome. Her sciatic pain has improved. \par

## 2024-10-02 ENCOUNTER — APPOINTMENT (OUTPATIENT)
Dept: ORTHOPEDIC SURGERY | Facility: CLINIC | Age: 74
End: 2024-10-02
Payer: MEDICARE

## 2024-10-02 VITALS
DIASTOLIC BLOOD PRESSURE: 90 MMHG | HEIGHT: 67 IN | SYSTOLIC BLOOD PRESSURE: 142 MMHG | BODY MASS INDEX: 39.24 KG/M2 | WEIGHT: 250 LBS | HEART RATE: 73 BPM

## 2024-10-02 DIAGNOSIS — Z96.642 PRESENCE OF LEFT ARTIFICIAL HIP JOINT: ICD-10-CM

## 2024-10-02 DIAGNOSIS — Z47.1 AFTERCARE FOLLOWING JOINT REPLACEMENT SURGERY: ICD-10-CM

## 2024-10-02 DIAGNOSIS — Z96.641 AFTERCARE FOLLOWING JOINT REPLACEMENT SURGERY: ICD-10-CM

## 2024-10-02 DIAGNOSIS — Z96.642 AFTERCARE FOLLOWING JOINT REPLACEMENT SURGERY: ICD-10-CM

## 2024-10-02 DIAGNOSIS — Z96.641 PRESENCE OF RIGHT ARTIFICIAL HIP JOINT: ICD-10-CM

## 2024-10-02 PROCEDURE — 99214 OFFICE O/P EST MOD 30 MIN: CPT

## 2024-10-02 PROCEDURE — 73502 X-RAY EXAM HIP UNI 2-3 VIEWS: CPT

## 2024-10-02 PROCEDURE — G2211 COMPLEX E/M VISIT ADD ON: CPT

## 2024-10-02 NOTE — HISTORY OF PRESENT ILLNESS
[Stable] : stable [0] : a current pain level of 0/10 [de-identified] : This is a 74 year old F pt presents today for check-up for b/l RAVI. Pt is s/p Left total hip arthroplasty DOS:10/25/22. S/P: Right hybrid total hip arthroplasty. DOS: 10/20/22. Pt is ambulating with a cane today. She is overall doing well with the hips. She is overall happy with the surgical outcome.

## 2024-10-02 NOTE — PHYSICAL EXAM
[de-identified] : GENERAL APPEARANCE: Well nourished and hydrated, pleasant, alert, and oriented x 3. Appears their stated age. HEENT: Normocephalic, extraocular eye motion intact. Nasal septum midline. Oral cavity clear. External auditory canal clear. RESPIRATORY: Breath sounds clear and audible in all lobes. No wheezing, No accessory muscle use. CARDIOVASCULAR: No apparent abnormalities. No lower leg edema. No varicosities. Pedal pulses are palpable. NEUROLOGIC: Sensation is normal, no muscle weakness in the upper or lower extremities. DERMATOLOGIC: No apparent skin lesions, moist, warm, no rash. SPINE: Cervical spine appears normal and moves freely; thoracic spine appears normal and moves freely; lumbosacral spine appears normal and moves freely, normal, nontender. MUSCULOSKELETAL: Hands, wrists, and elbows are normal and move freely, shoulders are normal and move freely. PSYCHIATRIC: Oriented to person, place, and time, insight and judgement were intact and the affect was normal. 5/5 motor strength in bilateral lower extremities. Sensory: Intact in bilateral lower extremities. DTRs: Biceps, brachioradialis, triceps, patellar, ankle and plantar 2+ and symmetric bilaterally. Pulses: dorsalis pedis, posterior tibial, femoral, popliteal, and radial 2+ and symmetric bilaterally.  Constitutional: Alert and in no acute distress, but well-appearing.   [de-identified] : Bilateral hip exam shows healed incision with no sign of infection. [de-identified] : 3V xray of the bilateral hips done in office today and reviewed by Dr. Randy Salazar demonstrates s/p cemented/ Hybrid bilateral hips implants in good positioning with no evidence of wear, loosening, or subsidence.

## 2024-10-02 NOTE — DISCUSSION/SUMMARY
[Medication Risks Reviewed] : Medication risks reviewed [de-identified] : This is a 74 year old F pt presents today s/p Left total hip arthroplasty DOS:10/25/22. S/P: Right hybrid total hip arthroplasty. DOS: 10/20/22. I reassured the pt that her implants are stable with no evidence of loosening or wear. Patient will continue to adhere to the posterior hip precautions. Pt understands the importance of prophylaxis for invasive dental procedures. She should continue to do low impact exercises. F/u 5 years from surgery.

## 2024-10-02 NOTE — REASON FOR VISIT
[Follow-Up Visit] : a follow-up visit for [FreeTextEntry2] : s/p Left total hip arthroplasty DOS:10/25/22. S/P: Right hybrid total hip arthroplasty. DOS: 10/20/22.

## 2024-10-02 NOTE — END OF VISIT
[FreeTextEntry3] : I, Shantanu Henao, acted solely as a scribe for Dr. Randy Salazar on this date 10/02/2024. I personally performed the services described in the documentation, reviewed the documentation recorded by the scribe in my presence, and it accurately and completely records my words and actions.

## (undated) DEVICE — DRSG TEGADERM 6"X8"

## (undated) DEVICE — SOL IRR POUR NS 0.9% 1000ML

## (undated) DEVICE — ZIMMER PULSAVAC PLUS FAN KIT

## (undated) DEVICE — SAW BLADE ZIMMER RECIPROCATING SINGLE SIDED 10X70X1.19MM

## (undated) DEVICE — DRSG DERMABOND PRINEO 22CM

## (undated) DEVICE — DRAPE SPLIT SHEET 77" X 108"

## (undated) DEVICE — ELCTR STRYKER NEPTUNE SMOKE EVACUATION PENCIL (GREEN)

## (undated) DEVICE — DRILL BIT BRASSELER TWIST 2.4MM 3/32 X 5"

## (undated) DEVICE — PACK TOTAL HIP

## (undated) DEVICE — ELCTR AQUAMANTYS BIPOLAR SEALER 6.0

## (undated) DEVICE — GLV 8 PROTEXIS (WHITE)

## (undated) DEVICE — DRAPE 1/2 SHEET 40X57"

## (undated) DEVICE — SUT HEWSON RETRIEVER

## (undated) DEVICE — SUT MONOCRYL 3-0 27" PS-2 UNDYED

## (undated) DEVICE — DRAPE HIP W POUCHES 87X115X134"

## (undated) DEVICE — NDL HYPO SAFE 20G X 1.5" (YELLOW)

## (undated) DEVICE — SOL IRR POUR H2O 1500ML

## (undated) DEVICE — SUT VICRYL 2-0 27" CT-2 UNDYED

## (undated) DEVICE — DRAPE U LONG 47X70"

## (undated) DEVICE — ZIMMER CEMENT MIXING SYSTEM COMPACT VACUUM

## (undated) DEVICE — DRAPE XL SHEET 77X98"

## (undated) DEVICE — SUT ETHIBOND 5 4-30" CCS

## (undated) DEVICE — WARMING BLANKET UPPER ADULT

## (undated) DEVICE — SUT VICRYL 0 18" CT-1 UNDYED (POP-OFF)

## (undated) DEVICE — SYR LUER LOK 50CC

## (undated) DEVICE — SOL BETADINE POUCH 0.75OZ STERILE

## (undated) DEVICE — DRAPE 3/4 SHEET 52X76"

## (undated) DEVICE — SOL IRR POUR NS 0.9% 500ML